# Patient Record
Sex: MALE | Race: WHITE | NOT HISPANIC OR LATINO | Employment: FULL TIME | URBAN - METROPOLITAN AREA
[De-identification: names, ages, dates, MRNs, and addresses within clinical notes are randomized per-mention and may not be internally consistent; named-entity substitution may affect disease eponyms.]

---

## 2017-06-26 ENCOUNTER — OFFICE VISIT (OUTPATIENT)
Dept: INTERNAL MEDICINE | Facility: CLINIC | Age: 37
End: 2017-06-26

## 2017-06-26 VITALS
SYSTOLIC BLOOD PRESSURE: 126 MMHG | WEIGHT: 175 LBS | OXYGEN SATURATION: 98 % | BODY MASS INDEX: 23.7 KG/M2 | HEIGHT: 72 IN | HEART RATE: 79 BPM | TEMPERATURE: 97.5 F | DIASTOLIC BLOOD PRESSURE: 86 MMHG

## 2017-06-26 DIAGNOSIS — E03.9 ACQUIRED HYPOTHYROIDISM: ICD-10-CM

## 2017-06-26 DIAGNOSIS — E06.0 ACUTE THYROIDITIS: Primary | ICD-10-CM

## 2017-06-26 DIAGNOSIS — E78.2 MIXED HYPERLIPIDEMIA: ICD-10-CM

## 2017-06-26 PROCEDURE — 99203 OFFICE O/P NEW LOW 30 MIN: CPT | Performed by: FAMILY MEDICINE

## 2017-06-26 RX ORDER — HYDROCODONE BITARTRATE AND ACETAMINOPHEN 7.5; 325 MG/1; MG/1
TABLET ORAL
COMMUNITY
Start: 2017-06-23 | End: 2017-08-11

## 2017-06-26 RX ORDER — LEVOTHYROXINE SODIUM 0.07 MG/1
75 TABLET ORAL DAILY
COMMUNITY
End: 2017-06-26 | Stop reason: SDUPTHER

## 2017-06-26 RX ORDER — LEVOTHYROXINE SODIUM 0.07 MG/1
75 TABLET ORAL DAILY
Qty: 90 TABLET | Refills: 3 | Status: SHIPPED | OUTPATIENT
Start: 2017-06-26 | End: 2017-08-16 | Stop reason: SDUPTHER

## 2017-06-26 NOTE — PROGRESS NOTES
Subjective   Babatunde Markham is a 36 y.o. male.     Chief Complaint   Patient presents with   • Hypothyroidism   • Hyperlipidemia         History of Present Illness   The patient is a delightful gentleman who is a  rep was discovered to have hypothyroidism with elevated TSH and hyperlipidemia earlier this year in [place].  He was placed on thyroid replacement but developed mild hyperthyroidism on a dose of 125 µg levothyroxine.  Otherwise is now on 75 µg daily and is relatively asymptomatic but has evidence of thyroiditis based on thyroid antibodies.  This difficult to say the level of hyperlipidemia will have when he is clinically euthyroid.      The following portions of the patient's history were reviewed and updated as appropriate: allergies, current medications, past social history and problem list.    Review of Systems   Constitutional: Negative.    HENT: Negative.    Eyes: Negative.    Respiratory: Negative.    Cardiovascular: Negative.    Gastrointestinal: Negative.    Endocrine: Negative.    Genitourinary: Negative.    Musculoskeletal: Negative.    Skin: Negative.    Allergic/Immunologic: Negative.    Neurological: Negative.    Hematological: Negative.    Psychiatric/Behavioral: Negative.        Objective   Vitals:    06/26/17 1316   BP: 126/86   Pulse: 79   Temp: 97.5 °F (36.4 °C)   SpO2: 98%     Physical Exam   Constitutional: He is oriented to person, place, and time. He appears well-developed and well-nourished.   HENT:   Head: Normocephalic and atraumatic.   Right Ear: Tympanic membrane and external ear normal.   Left Ear: Tympanic membrane and external ear normal.   Nose: Nose normal.   Mouth/Throat: Oropharynx is clear and moist.   Eyes: Conjunctivae and EOM are normal. Pupils are equal, round, and reactive to light.   Neck: Normal range of motion. Neck supple. No JVD present. Thyromegaly present.       Cardiovascular: Normal rate, regular rhythm, normal heart sounds and intact distal  pulses.    Pulmonary/Chest: Effort normal and breath sounds normal.   Abdominal: Soft. Bowel sounds are normal.   Musculoskeletal: Normal range of motion.   Lymphadenopathy:     He has no cervical adenopathy.   Neurological: He is alert and oriented to person, place, and time. No cranial nerve deficit. Coordination normal.   Skin: Skin is warm and dry. No rash noted.   Psychiatric: He has a normal mood and affect. His behavior is normal. Judgment and thought content normal.   Vitals reviewed.      Assessment/Plan   Problem List Items Addressed This Visit     None      Visit Diagnoses     Acute thyroiditis    -  Primary    Relevant Medications    levothyroxine (SYNTHROID, LEVOTHROID) 75 MCG tablet    Other Relevant Orders    TSH    T4, Free    T3, Free    Ambulatory Referral to Endocrinology    Acquired hypothyroidism        Relevant Medications    levothyroxine (SYNTHROID, LEVOTHROID) 75 MCG tablet    Other Relevant Orders    TSH    T4, Free    T3, Free    Ambulatory Referral to Endocrinology    Mixed hyperlipidemia        Relevant Orders    TSH    T4, Free    T3, Free    Ambulatory Referral to Endocrinology      Plan: Continue levothyroxine 75 µg daily with follow-up with endocrinology.  Return visit in about 4 months in regards to hyperlipidemia in conjunction with hypothyroidism.

## 2017-06-27 LAB
T3FREE SERPL-MCNC: 4 PG/ML (ref 2–4.4)
T4 FREE SERPL-MCNC: 1.4 NG/DL (ref 0.93–1.7)
TSH SERPL DL<=0.005 MIU/L-ACNC: 0.07 MIU/ML (ref 0.27–4.2)

## 2017-08-11 ENCOUNTER — OFFICE VISIT (OUTPATIENT)
Dept: ENDOCRINOLOGY | Age: 37
End: 2017-08-11

## 2017-08-11 VITALS
OXYGEN SATURATION: 98 % | DIASTOLIC BLOOD PRESSURE: 72 MMHG | WEIGHT: 174 LBS | HEART RATE: 70 BPM | BODY MASS INDEX: 23.57 KG/M2 | HEIGHT: 72 IN | SYSTOLIC BLOOD PRESSURE: 128 MMHG

## 2017-08-11 DIAGNOSIS — E03.8 OTHER SPECIFIED HYPOTHYROIDISM: Primary | ICD-10-CM

## 2017-08-11 DIAGNOSIS — R53.83 OTHER FATIGUE: ICD-10-CM

## 2017-08-11 PROCEDURE — 99244 OFF/OP CNSLTJ NEW/EST MOD 40: CPT | Performed by: INTERNAL MEDICINE

## 2017-08-11 NOTE — PROGRESS NOTES
Chief Complaint   Patient presents with   • Hypothyroidism   New Patient Appointment/ Hypothyroidism    Babatunde Markham 36 y.o. WM presents as a new patient for the evaluation of Hypothyroidism. Consulted by   Pt was diagnosed with Hypothyroidism in April 2017 for his routine health exam. He was noted to have elevated TSH.   Pt reports having symptoms for about 6 months and they have been gradually getting worse.   Currently on levothyroxine 75 mcg oral daily, takes the medication every day on empty stomach.     He does report better energy levels now that he is on thyroid medication, pt lost about 10 pounds in the last few months with exercise and diet changes, normal Bm, no hair loss, no increased sweating, some dry skin, sleep is disturbed. Normal temperature preference. No c/o tremors, no racing of heart and no eye symptoms.   Denied c/o difficulty breathing, some difficulty in swallowing and no change in voice.   No family hx of thyroid disease.     Thyroid U/S 4/14/17 - Rt thyroid lobe showed 7 mm nodule.     I have reviewed the patient's allergies, medicines, past medical hx, family hx and social hx in detail.    Past Medical History:   Diagnosis Date   • Acquired hypothyroidism    • Acute thyroiditis    • Mixed hyperlipidemia        Family History   Problem Relation Age of Onset   • Heart disease Father    • Heart disease Maternal Grandmother    • Heart disease Maternal Grandfather    • Alzheimer's disease Paternal Grandmother    • Alzheimer's disease Paternal Grandfather        Social History     Social History   • Marital status:      Spouse name: N/A   • Number of children: N/A   • Years of education: N/A     Occupational History   • Not on file.     Social History Main Topics   • Smoking status: Never Smoker   • Smokeless tobacco: Never Used   • Alcohol use No   • Drug use: Not on file   • Sexual activity: Not on file     Other Topics Concern   • Not on file     Social History Narrative  "      No Known Allergies      Current Outpatient Prescriptions:   •  levothyroxine (SYNTHROID, LEVOTHROID) 75 MCG tablet, Take 1 tablet by mouth Daily., Disp: 90 tablet, Rfl: 3     Review of Systems   Constitutional: Negative for fever.   HENT: Negative for facial swelling, nosebleeds, trouble swallowing and voice change.    Eyes: Negative for pain and redness.   Respiratory: Negative for shortness of breath and wheezing.    Cardiovascular: Negative for palpitations and leg swelling.   Gastrointestinal: Negative for abdominal pain, diarrhea and vomiting.   Endocrine: Negative for polydipsia and polyuria.   Genitourinary: Negative for decreased urine volume and frequency.   Musculoskeletal: Negative for joint swelling and neck pain.   Skin: Negative for rash.   Allergic/Immunologic: Negative for immunocompromised state.   Neurological: Negative for seizures and facial asymmetry.   Hematological: Does not bruise/bleed easily.   Psychiatric/Behavioral: Negative for agitation and confusion.       Objective:    /72  Pulse 70  Ht 72\" (182.9 cm)  Wt 174 lb (78.9 kg)  SpO2 98%  BMI 23.6 kg/m2    Physical Exam  Gen exam - alert and oriented x 3, normal built, not in distress.   HEENT - No acanthosis nigricans. Thyroid palpable. Thyroid nodule not palpable  Resp - Clear to auscultation.   CVS - S1,S2 heard and no murmurs.   Abd - Non tender, BS heard.   Ext - No edema and intact pin prick and proprioception. Normal reflexes    Results Review:    I reviewed the patient's new clinical results.    Office Visit on 06/26/2017   Component Date Value Ref Range Status   • TSH 06/26/2017 0.074* 0.270 - 4.200 mIU/mL Final   • Free T4 06/26/2017 1.40  0.93 - 1.70 ng/dL Final   • T3, Free 06/26/2017 4.0  2.0 - 4.4 pg/mL Final         Babatunde was seen today for hypothyroidism.    Diagnoses and all orders for this visit:    Other specified hypothyroidism  -     TSH  -     T4, Free  -     Thyroid Peroxidase Antibody  -     Basic " Metabolic Panel  -     Vitamin D 25 Hydroxy  -     Lipid Panel  -     Lipid Panel; Future  -     TSH; Future  -     Vitamin B12 & Folate; Future  -     Vitamin D 25 Hydroxy; Future    Other fatigue  -     TSH  -     T4, Free  -     Thyroid Peroxidase Antibody  -     Basic Metabolic Panel  -     Vitamin D 25 Hydroxy  -     Lipid Panel  -     Lipid Panel; Future  -     TSH; Future  -     Vitamin B12 & Folate; Future  -     Vitamin D 25 Hydroxy; Future      Hypothyroidism  Continue levothyroxine 75 µg oral daily.  Will adjust the dosage of levothyroxine based on patient's blood work up to date.    Thyroid nodule  Since the thyroid nodule is less than 1 cm in size would hold off on FNA at this time.  Will repeat thyroid ultrasound in a period of one year to monitor for the change in the size of the thyroid nodule.    Chronic fatigue  Will check vitamin D 25-hydroxy, lipid panel  Counseled the patient if patient's lipid panel is still significantly elevated given his family history of cardiac disease might consider starting the patient on statin.    Thank you for asking me to see your patient, Babatunde Markham in consultation.    34 minutes out of 60 minutes face to face spent in counseling the patient extensively on hypothyroidism, pathogenesis and FNA guidelines for thyroid nodule biopsy.    Rowdy De La Cruz MD  08/11/17

## 2017-08-15 ENCOUNTER — LAB (OUTPATIENT)
Dept: ENDOCRINOLOGY | Age: 37
End: 2017-08-15

## 2017-08-15 DIAGNOSIS — E03.8 OTHER SPECIFIED HYPOTHYROIDISM: ICD-10-CM

## 2017-08-15 DIAGNOSIS — R53.83 OTHER FATIGUE: ICD-10-CM

## 2017-08-15 LAB
25(OH)D3+25(OH)D2 SERPL-MCNC: 26.6 NG/ML (ref 30–100)
CHOLEST SERPL-MCNC: 224 MG/DL (ref 0–200)
FOLATE SERPL-MCNC: 19.37 NG/ML (ref 4.78–24.2)
HDLC SERPL-MCNC: 43 MG/DL (ref 40–60)
LDLC SERPL CALC-MCNC: 164 MG/DL (ref 0–100)
TRIGL SERPL-MCNC: 83 MG/DL (ref 0–150)
TSH SERPL DL<=0.005 MIU/L-ACNC: 5.36 MIU/ML (ref 0.27–4.2)
VIT B12 SERPL-MCNC: 793 PG/ML (ref 211–946)
VLDLC SERPL CALC-MCNC: 16.6 MG/DL (ref 5–40)

## 2017-08-16 RX ORDER — LEVOTHYROXINE SODIUM 88 UG/1
88 TABLET ORAL DAILY
Qty: 30 TABLET | Refills: 11 | Status: SHIPPED | OUTPATIENT
Start: 2017-08-16 | End: 2018-01-30 | Stop reason: SDUPTHER

## 2017-08-16 RX ORDER — ERGOCALCIFEROL 1.25 MG/1
50000 CAPSULE ORAL WEEKLY
Qty: 30 CAPSULE | Refills: 11 | Status: SHIPPED | OUTPATIENT
Start: 2017-08-16 | End: 2022-05-23

## 2017-10-27 ENCOUNTER — OFFICE VISIT (OUTPATIENT)
Dept: INTERNAL MEDICINE | Facility: CLINIC | Age: 37
End: 2017-10-27

## 2017-10-27 VITALS
DIASTOLIC BLOOD PRESSURE: 72 MMHG | TEMPERATURE: 97.7 F | HEART RATE: 82 BPM | BODY MASS INDEX: 24.14 KG/M2 | WEIGHT: 178 LBS | OXYGEN SATURATION: 98 % | SYSTOLIC BLOOD PRESSURE: 118 MMHG

## 2017-10-27 DIAGNOSIS — E03.9 ACQUIRED HYPOTHYROIDISM: Primary | ICD-10-CM

## 2017-10-27 DIAGNOSIS — E06.0 ACUTE THYROIDITIS: ICD-10-CM

## 2017-10-27 DIAGNOSIS — E78.2 MIXED HYPERLIPIDEMIA: ICD-10-CM

## 2017-10-27 PROCEDURE — 99213 OFFICE O/P EST LOW 20 MIN: CPT | Performed by: FAMILY MEDICINE

## 2017-10-27 NOTE — PROGRESS NOTES
Subjective   Babatunde Markham is a 37 y.o. male.     Chief Complaint   Patient presents with   • Hypothyroidism   • Hyperlipidemia   Patient is a healthy 36-year-old gentleman with history of moderate hypercholesterolemia and family history of atherosclerotic heart disease.  He has had thyroiditis and is now on thyroid supplement with Synthroid 88 µg daily.  He is followed by endocrinology also.  We discussed is improved cholesterol panel which is expected given normalization of his thyroid treatment.  He would like to try staying off statins for a while and I will see him back in one year he will follow-up with endocrinology as well.      History of Present Illness    Mr. Markham has returned for follow-up of hypothyroidism with prior thyroiditis.. He has   The following portions of the patient's history were reviewed and updated as appropriate: allergies, current medications, past social history and problem list.    Review of Systems   Constitutional: Negative.    HENT: Negative.    Eyes: Negative.    Respiratory: Negative.    Cardiovascular: Negative.    Gastrointestinal: Negative.    Endocrine: Negative.    Genitourinary: Negative.    Musculoskeletal: Negative.    Skin: Negative.    Allergic/Immunologic: Negative.    Neurological: Negative.    Hematological: Negative.    Psychiatric/Behavioral: Negative.        Objective   Vitals:    10/27/17 0952   BP: 118/72   Pulse: 82   Temp: 97.7 °F (36.5 °C)   SpO2: 98%     Physical Exam   Constitutional: He is oriented to person, place, and time. He appears well-developed and well-nourished.   HENT:   Head: Normocephalic and atraumatic.   Right Ear: Tympanic membrane and external ear normal.   Left Ear: Tympanic membrane and external ear normal.   Nose: Nose normal.   Mouth/Throat: Oropharynx is clear and moist.   Eyes: Conjunctivae and EOM are normal. Pupils are equal, round, and reactive to light.   Neck: Normal range of motion. Neck supple. No JVD present. No thyromegaly  present.   Cardiovascular: Normal rate, regular rhythm, normal heart sounds and intact distal pulses.    Pulmonary/Chest: Effort normal and breath sounds normal.   Abdominal: Soft. Bowel sounds are normal.   Musculoskeletal: Normal range of motion.   Lymphadenopathy:     He has no cervical adenopathy.   Neurological: He is alert and oriented to person, place, and time. No cranial nerve deficit. Coordination normal.   Skin: Skin is warm and dry. No rash noted.   Psychiatric: He has a normal mood and affect. His behavior is normal. Judgment and thought content normal.   Vitals reviewed.      Assessment/Plan   Problem List Items Addressed This Visit        Cardiovascular and Mediastinum    Mixed hyperlipidemia       Endocrine    Acute thyroiditis    Acquired hypothyroidism - Primary      Plan: Follow-up one year no changes in medications for follow-up with endocrinology recheck lipid panel one year or sooner.

## 2018-01-16 ENCOUNTER — OFFICE VISIT CONVERTED (OUTPATIENT)
Dept: FAMILY MEDICINE CLINIC | Age: 38
End: 2018-01-16
Attending: FAMILY MEDICINE

## 2018-01-24 LAB
25(OH)D3+25(OH)D2 SERPL-MCNC: 40.1 NG/ML (ref 30–100)
BUN SERPL-MCNC: 15 MG/DL (ref 6–20)
BUN/CREAT SERPL: 11.6 (ref 7–25)
CALCIUM SERPL-MCNC: 9.9 MG/DL (ref 8.6–10.5)
CHLORIDE SERPL-SCNC: 102 MMOL/L (ref 98–107)
CHOLEST SERPL-MCNC: 217 MG/DL (ref 0–200)
CO2 SERPL-SCNC: 27.5 MMOL/L (ref 22–29)
CREAT SERPL-MCNC: 1.29 MG/DL (ref 0.76–1.27)
GLUCOSE SERPL-MCNC: 93 MG/DL (ref 65–99)
HDLC SERPL-MCNC: 40 MG/DL (ref 40–60)
INTERPRETATION: NORMAL
LDLC SERPL CALC-MCNC: 157 MG/DL (ref 0–100)
POTASSIUM SERPL-SCNC: 4.7 MMOL/L (ref 3.5–5.2)
SODIUM SERPL-SCNC: 141 MMOL/L (ref 136–145)
T4 FREE SERPL-MCNC: 1.31 NG/DL (ref 0.93–1.7)
THYROPEROXIDASE AB SERPL-ACNC: >600 IU/ML (ref 0–34)
TRIGL SERPL-MCNC: 102 MG/DL (ref 0–150)
TSH SERPL DL<=0.005 MIU/L-ACNC: 7.98 MIU/ML (ref 0.27–4.2)
VLDLC SERPL CALC-MCNC: 20.4 MG/DL (ref 5–40)

## 2018-01-29 ENCOUNTER — OFFICE VISIT CONVERTED (OUTPATIENT)
Dept: FAMILY MEDICINE CLINIC | Age: 38
End: 2018-01-29
Attending: NURSE PRACTITIONER

## 2018-01-30 ENCOUNTER — OFFICE VISIT (OUTPATIENT)
Dept: ENDOCRINOLOGY | Age: 38
End: 2018-01-30

## 2018-01-30 VITALS
DIASTOLIC BLOOD PRESSURE: 74 MMHG | HEIGHT: 72 IN | HEART RATE: 89 BPM | OXYGEN SATURATION: 99 % | SYSTOLIC BLOOD PRESSURE: 122 MMHG | BODY MASS INDEX: 23.7 KG/M2 | WEIGHT: 175 LBS

## 2018-01-30 DIAGNOSIS — R68.89 FLU-LIKE SYMPTOMS: ICD-10-CM

## 2018-01-30 DIAGNOSIS — E55.9 VITAMIN D DEFICIENCY: ICD-10-CM

## 2018-01-30 DIAGNOSIS — E03.9 ACQUIRED HYPOTHYROIDISM: Primary | ICD-10-CM

## 2018-01-30 PROCEDURE — 99213 OFFICE O/P EST LOW 20 MIN: CPT | Performed by: INTERNAL MEDICINE

## 2018-01-30 RX ORDER — LEVOTHYROXINE SODIUM 0.1 MG/1
100 TABLET ORAL DAILY
Qty: 30 TABLET | Refills: 11 | Status: SHIPPED | OUTPATIENT
Start: 2018-01-30 | End: 2019-02-18 | Stop reason: SDUPTHER

## 2018-01-30 NOTE — PROGRESS NOTES
37 y.o.    Patient Care Team:  Valentin Shi Jr., MD as PCP - General (Family Medicine)    Chief Complaint:    5 MONTH FOLLOW UP/HYPOTHROIDISM  Subjective     HPI    Babatunde Markham 37 y.o. WM presents as a follow up patient for the evaluation of Hypothyroidism. Consulted by     Pt was diagnosed with Hypothyroidism in April 2017 for his routine health exam.   Currently on levothyroxine 88 µg oral daily, patient is not so compliant with his medication, happens to forget dosages at least 2-3 times a month.    His energy levels are very poor, he is suffering with a viral infection, reports extreme body aches, weight has been relatively stable, normal bowel movements, no hair loss, complains of increased sweating and dry skin, sleep is disturbed again due to viral infection.  Feels extremely cold.  No tremors, racing of heart or eye symptoms  Does complain of shortness of breath, difficulty in swallowing due to the sore throat and does have change in voice.    No family hx of thyroid disease.      Thyroid U/S 4/14/17 - Rt thyroid lobe showed 7 mm nodule.     Pt c/o body aches, pain in the stomach and back ache, c/o sob, c/o sore throat, cough with some phlegm, eating very little, decreased appetite, no nausea or vomiting.     Interval History      The following portions of the patient's history were reviewed and updated as appropriate: allergies, current medications, past family history, past medical history, past social history, past surgical history and problem list.    Past Medical History:   Diagnosis Date   • Acquired hypothyroidism    • Acute thyroiditis    • Mixed hyperlipidemia      Family History   Problem Relation Age of Onset   • Heart disease Father    • Heart disease Maternal Grandmother    • Heart disease Maternal Grandfather    • Alzheimer's disease Paternal Grandmother    • Alzheimer's disease Paternal Grandfather      Social History     Social History   • Marital status:      Spouse name: N/A  "  • Number of children: N/A   • Years of education: N/A     Occupational History   • Not on file.     Social History Main Topics   • Smoking status: Never Smoker   • Smokeless tobacco: Never Used   • Alcohol use No   • Drug use: Not on file   • Sexual activity: Not on file     Other Topics Concern   • Not on file     Social History Narrative     No Known Allergies    Current Outpatient Prescriptions:   •  levothyroxine (SYNTHROID, LEVOTHROID) 100 MCG tablet, Take 1 tablet by mouth Daily., Disp: 30 tablet, Rfl: 11  •  vitamin D (ERGOCALCIFEROL) 24871 units capsule capsule, Take 1 capsule by mouth 1 (One) Time Per Week., Disp: 30 capsule, Rfl: 11        Review of Systems   Constitutional: Positive for fatigue and fever.   HENT: Positive for sore throat. Negative for facial swelling, nosebleeds, trouble swallowing and voice change.    Eyes: Negative for pain and redness.   Respiratory: Positive for shortness of breath. Negative for wheezing.    Cardiovascular: Negative for palpitations and leg swelling.   Gastrointestinal: Negative for abdominal pain, diarrhea and vomiting.   Endocrine: Negative for polydipsia and polyuria.   Genitourinary: Negative for decreased urine volume and frequency.   Musculoskeletal: Negative for joint swelling and neck pain.   Skin: Negative for rash.   Allergic/Immunologic: Negative for immunocompromised state.   Neurological: Positive for dizziness, light-headedness and headaches. Negative for seizures and facial asymmetry.   Hematological: Does not bruise/bleed easily.   Psychiatric/Behavioral: Negative for agitation and confusion.       Objective       Vitals:    01/30/18 1333   BP: 122/74   Pulse: 89   SpO2: 99%   Weight: 79.4 kg (175 lb)   Height: 182.9 cm (72\")     Body mass index is 23.73 kg/(m^2).      Physical Exam   Gen exam - alert and oriented x 3, not in distress,warm to touch.   HEENT - No acanthosis nigricans. Thyroid palpable.   Resp - Clear to auscultation.   CVS - S1,S2 " heard and no murmurs.   Abd - Non tender, BS heard.   Ext - No edema    Results Review:     I reviewed the patient's new clinical results.    Medical records reviewed  Summary:done      Lab on 08/15/2017   Component Date Value Ref Range Status   • Total Cholesterol 08/15/2017 224* 0 - 200 mg/dL Final   • Triglycerides 08/15/2017 83  0 - 150 mg/dL Final   • HDL Cholesterol 08/15/2017 43  40 - 60 mg/dL Final   • VLDL Cholesterol 08/15/2017 16.6  5 - 40 mg/dL Final   • LDL Cholesterol  08/15/2017 164* 0 - 100 mg/dL Final   • TSH 08/15/2017 5.360* 0.270 - 4.200 mIU/mL Final   • Vitamin B-12 08/15/2017 793  211 - 946 pg/mL Final   • Folate 08/15/2017 19.37  4.78 - 24.20 ng/mL Final   • 25 Hydroxy, Vitamin D 08/15/2017 26.6* 30.0 - 100.0 ng/mL Final    Comment: Reference Range for Total Vitamin D 25(OH)  Deficiency    <20.0 ng/mL  Insufficiency 21-29 ng/mL  Sufficiency    ng/mL  Toxicity      >100 ng/ml          No results found for: HGBA1C  Lab Results   Component Value Date    CREATININE 1.29 (H) 01/23/2018     Imaging Results (most recent)     None                Assessment and Plan:    Babatunde was seen today for hypothyroidism.    Diagnoses and all orders for this visit:    Acquired hypothyroidism  -     TSH; Future  -     T4, Free; Future    Vitamin D deficiency  -     TSH; Future  -     T4, Free; Future    Flu-like symptoms    Other orders  -     levothyroxine (SYNTHROID, LEVOTHROID) 100 MCG tablet; Take 1 tablet by mouth Daily.    Hypothyroidism  TSH levels are still high could be related to his noncompliance  Explained to the patient to take the medication every single day on empty stomach  Will increase levothyroxin to 100 µg oral daily  Will repeat thyroid function tests in a period of 6 weeks    Vitamin D deficiency  Continue vitamin D replacement 50,000 units every weekly    Flulike symptoms  Suspect patient could have flu or strep throat based on his symptoms  Alerted the patient to go to his primary care  physician or urgent care.      Reviewed Lab results with the patient.     12 minutes out of 20 minutes face to face spent in counseling the patient extensively on on medication changes, referral instructions.

## 2018-03-13 ENCOUNTER — RESULTS ENCOUNTER (OUTPATIENT)
Dept: ENDOCRINOLOGY | Age: 38
End: 2018-03-13

## 2018-03-13 DIAGNOSIS — E55.9 VITAMIN D DEFICIENCY: ICD-10-CM

## 2018-03-13 DIAGNOSIS — E03.9 ACQUIRED HYPOTHYROIDISM: ICD-10-CM

## 2018-04-19 LAB
T4 FREE SERPL-MCNC: 1.45 NG/DL (ref 0.93–1.7)
TSH SERPL DL<=0.005 MIU/L-ACNC: 0.75 MIU/ML (ref 0.27–4.2)

## 2018-05-03 ENCOUNTER — OFFICE VISIT (OUTPATIENT)
Dept: ENDOCRINOLOGY | Age: 38
End: 2018-05-03

## 2018-05-03 VITALS
HEIGHT: 72 IN | OXYGEN SATURATION: 98 % | DIASTOLIC BLOOD PRESSURE: 72 MMHG | WEIGHT: 172 LBS | BODY MASS INDEX: 23.3 KG/M2 | SYSTOLIC BLOOD PRESSURE: 120 MMHG | HEART RATE: 70 BPM

## 2018-05-03 DIAGNOSIS — E55.9 VITAMIN D DEFICIENCY: ICD-10-CM

## 2018-05-03 DIAGNOSIS — E03.9 ACQUIRED HYPOTHYROIDISM: Primary | ICD-10-CM

## 2018-05-03 PROCEDURE — 99213 OFFICE O/P EST LOW 20 MIN: CPT | Performed by: INTERNAL MEDICINE

## 2018-05-03 NOTE — PROGRESS NOTES
37 y.o.    Patient Care Team:  Valentin Shi Jr., MD as PCP - General (Family Medicine)    Chief Complaint:    3 MONTH FOLLOW UP/HYPOTHYROIDISM  Subjective     HPI    Babatunde RITA Markham 37 y.o. WM presents as a follow up patient for the evaluation of Hypothyroidism. Consulted by      Pt was diagnosed with Hypothyroidism in April 2017 for his routine health exam.   Currently on levothyroxine 100 mcg oral daily, takes the medication daily.     Patient reports his energy levels are good, weight has been relatively stable, normal bowel movements, no hair loss, no increased sweating or dry skin.  Sleep is decently good.  No heat or cold intolerance.  No tremors, racing of heart or eye symptoms.  No complaints of shortness of breath, difficulty in swallowing or change in voice.  No family history of thyroid disease.  Thyroid U/S 4/14/17 - Rt thyroid lobe showed 7 mm nodule.          Interval History      The following portions of the patient's history were reviewed and updated as appropriate: allergies, current medications, past family history, past medical history, past social history, past surgical history and problem list.    Past Medical History:   Diagnosis Date   • Acquired hypothyroidism    • Acute thyroiditis    • Mixed hyperlipidemia      Family History   Problem Relation Age of Onset   • Heart disease Father    • Heart disease Maternal Grandmother    • Heart disease Maternal Grandfather    • Alzheimer's disease Paternal Grandmother    • Alzheimer's disease Paternal Grandfather      Social History     Social History   • Marital status:      Spouse name: N/A   • Number of children: N/A   • Years of education: N/A     Occupational History   • Not on file.     Social History Main Topics   • Smoking status: Never Smoker   • Smokeless tobacco: Never Used   • Alcohol use No   • Drug use: Unknown   • Sexual activity: Not on file     Other Topics Concern   • Not on file     Social History Narrative   • No  "narrative on file     No Known Allergies    Current Outpatient Prescriptions:   •  levothyroxine (SYNTHROID, LEVOTHROID) 100 MCG tablet, Take 1 tablet by mouth Daily., Disp: 30 tablet, Rfl: 11  •  vitamin D (ERGOCALCIFEROL) 71904 units capsule capsule, Take 1 capsule by mouth 1 (One) Time Per Week., Disp: 30 capsule, Rfl: 11        Review of Systems   Constitutional: Negative for appetite change, fatigue and fever.   Eyes: Negative for visual disturbance.   Respiratory: Negative for shortness of breath.    Cardiovascular: Negative for palpitations and leg swelling.   Gastrointestinal: Negative for abdominal pain and vomiting.   Endocrine: Negative for polydipsia and polyuria.   Musculoskeletal: Negative for joint swelling and neck pain.   Skin: Negative for rash.   Neurological: Negative for weakness and numbness.   Psychiatric/Behavioral: Negative for behavioral problems.       Objective       Vitals:    05/03/18 1025   BP: 120/72   Pulse: 70   SpO2: 98%   Weight: 78 kg (172 lb)   Height: 182.9 cm (72\")     Body mass index is 23.33 kg/m².      Physical Exam   Gen exam - alert and oriented x 3,not in distress.   HEENT - Thyroid palpable.   Resp - Clear to auscultation.   CVS - S1,S2 heard and no murmurs.   Abd - Non tender, BS heard.   Ext - No edema and intact pin prick and proprioception.     Results Review:    I reviewed the patient's new clinical results.    Medical records reviewed  Summary: done      Results Encounter on 03/13/2018   Component Date Value Ref Range Status   • TSH 04/19/2018 0.752  0.270 - 4.200 mIU/mL Final   • Free T4 04/19/2018 1.45  0.93 - 1.70 ng/dL Final     No results found for: HGBA1C  Lab Results   Component Value Date    CREATININE 1.29 (H) 01/23/2018     Imaging Results (most recent)     None                Assessment and Plan:    Babatunde was seen today for hypothyroidism.    Diagnoses and all orders for this visit:    Acquired hypothyroidism  -     Lipid Panel; Future  -     Vitamin D " 25 Hydroxy; Future  -     Vitamin B12 & Folate; Future  -     TSH; Future  -     T4, Free; Future    Vitamin D deficiency  -     Lipid Panel; Future  -     Vitamin D 25 Hydroxy; Future  -     Vitamin B12 & Folate; Future  -     TSH; Future  -     T4, Free; Future      Hypothyroidism  Continue levothyroxine 100 µg oral daily  TSH levels are within normal limits.    Vitamin D deficiency  Vitamin D levels are normal as of January 2018  Continue the current replacement dosages.    Hyperlipidemia  LDL levels are mildly elevated but since patient does not have diabetes, blood pressure or cardiac risk well not start patient on any lipid-lowering agent.    Reviewed Lab results with the patient.

## 2018-07-11 ENCOUNTER — OFFICE VISIT CONVERTED (OUTPATIENT)
Dept: FAMILY MEDICINE CLINIC | Age: 38
End: 2018-07-11
Attending: NURSE PRACTITIONER

## 2018-10-30 ENCOUNTER — RESULTS ENCOUNTER (OUTPATIENT)
Dept: ENDOCRINOLOGY | Age: 38
End: 2018-10-30

## 2018-10-30 DIAGNOSIS — E03.9 ACQUIRED HYPOTHYROIDISM: ICD-10-CM

## 2018-10-30 DIAGNOSIS — E55.9 VITAMIN D DEFICIENCY: ICD-10-CM

## 2018-11-13 LAB
25(OH)D3+25(OH)D2 SERPL-MCNC: 25.4 NG/ML (ref 30–100)
CHOLEST SERPL-MCNC: 220 MG/DL (ref 0–200)
FOLATE SERPL-MCNC: >20 NG/ML (ref 4.78–24.2)
HDLC SERPL-MCNC: 41 MG/DL (ref 40–60)
INTERPRETATION: NORMAL
LDLC SERPL CALC-MCNC: 158 MG/DL (ref 0–100)
T4 FREE SERPL-MCNC: 1.12 NG/DL (ref 0.93–1.7)
TRIGL SERPL-MCNC: 106 MG/DL (ref 0–150)
TSH SERPL DL<=0.005 MIU/L-ACNC: 15.63 MIU/ML (ref 0.27–4.2)
VIT B12 SERPL-MCNC: 1086 PG/ML (ref 211–946)
VLDLC SERPL CALC-MCNC: 21.2 MG/DL (ref 5–40)

## 2018-11-21 ENCOUNTER — OFFICE VISIT (OUTPATIENT)
Dept: ENDOCRINOLOGY | Age: 38
End: 2018-11-21

## 2018-11-21 VITALS
OXYGEN SATURATION: 98 % | WEIGHT: 179 LBS | BODY MASS INDEX: 24.24 KG/M2 | HEART RATE: 76 BPM | DIASTOLIC BLOOD PRESSURE: 70 MMHG | SYSTOLIC BLOOD PRESSURE: 130 MMHG | HEIGHT: 72 IN

## 2018-11-21 DIAGNOSIS — E55.9 VITAMIN D DEFICIENCY: ICD-10-CM

## 2018-11-21 DIAGNOSIS — E03.9 ACQUIRED HYPOTHYROIDISM: Primary | ICD-10-CM

## 2018-11-21 PROCEDURE — 99214 OFFICE O/P EST MOD 30 MIN: CPT | Performed by: INTERNAL MEDICINE

## 2018-11-21 NOTE — PROGRESS NOTES
38 y.o.    Patient Care Team:  Valentin Shi Jr., MD as PCP - General (Family Medicine)    Chief Complaint:    6 MONTH FOLLOW UP/ HYPOTHYROIDISM  Subjective     HPI     Babatunde B Rashi 38 y.o. WM presents as a follow up patient for the evaluation of Hypothyroidism. Consulted by     Patient was diagnosed with hypothyroidism in April 2017 on his routine physical examination.  Today in clinic he reports that he is on levothyroxin 100 µg oral daily, takes it on empty stomach.    He has been noncompliant with medication, has been taking the medication on and off for the last few months.  He reports that he has been busy taking care of his 3 kids while his wife has been sick and he was also traveling for work when he forgot to take the medication with him.    He does report that he is being tired for the last few months, gained about 5 pounds of weight since his last visit, normal bowel movements, no hair loss, no increased sweating or dry skin.  He has been sleeping relatively well.  Complains of some cold intolerance.  No hyperthyroid symptoms.  No complaints of shortness of breath, difficulty in swallowing or change in voice.  No family history of thyroid disease.     Thyroid U/S July 2018 - Rt thyroid lobe showed 7 mm nodule.       Reviewed primary care physician's/consulting physician documentation and lab results :     Interval History      The following portions of the patient's history were reviewed and updated as appropriate: allergies, current medications, past family history, past medical history, past social history, past surgical history and problem list.    Past Medical History:   Diagnosis Date   • Acquired hypothyroidism    • Acute thyroiditis    • Mixed hyperlipidemia      Family History   Problem Relation Age of Onset   • Heart disease Father    • Heart disease Maternal Grandmother    • Heart disease Maternal Grandfather    • Alzheimer's disease Paternal Grandmother    • Alzheimer's disease Paternal  "Grandfather      Social History     Socioeconomic History   • Marital status:      Spouse name: Not on file   • Number of children: Not on file   • Years of education: Not on file   • Highest education level: Not on file   Social Needs   • Financial resource strain: Not on file   • Food insecurity - worry: Not on file   • Food insecurity - inability: Not on file   • Transportation needs - medical: Not on file   • Transportation needs - non-medical: Not on file   Occupational History   • Not on file   Tobacco Use   • Smoking status: Never Smoker   • Smokeless tobacco: Never Used   Substance and Sexual Activity   • Alcohol use: No   • Drug use: Not on file   • Sexual activity: Not on file   Other Topics Concern   • Not on file   Social History Narrative   • Not on file     No Known Allergies    Current Outpatient Medications:   •  levothyroxine (SYNTHROID, LEVOTHROID) 100 MCG tablet, Take 1 tablet by mouth Daily., Disp: 30 tablet, Rfl: 11  •  vitamin D (ERGOCALCIFEROL) 59260 units capsule capsule, Take 1 capsule by mouth 1 (One) Time Per Week., Disp: 30 capsule, Rfl: 11        Review of Systems   Constitutional: Negative for appetite change, fatigue and fever.   Eyes: Negative for visual disturbance.   Respiratory: Negative for shortness of breath.    Cardiovascular: Negative for palpitations and leg swelling.   Gastrointestinal: Negative for abdominal pain and vomiting.   Endocrine: Negative for polydipsia and polyuria.   Musculoskeletal: Negative for joint swelling and neck pain.   Skin: Negative for rash.   Neurological: Negative for weakness and numbness.   Psychiatric/Behavioral: Negative for behavioral problems.       Objective       Vitals:    11/21/18 1022   BP: 130/70   Pulse: 76   SpO2: 98%   Weight: 81.2 kg (179 lb)   Height: 182.9 cm (72\")     Body mass index is 24.28 kg/m².      Physical Exam   Constitutional: He is oriented to person, place, and time. He appears well-nourished.   HENT:   Head: " Normocephalic and atraumatic.   Eyes: Conjunctivae and EOM are normal. No scleral icterus.   Neck: No thyromegaly present.   Cardiovascular: Normal rate and regular rhythm.   Pulmonary/Chest: Effort normal and breath sounds normal. No stridor. No respiratory distress. He has no wheezes.   Abdominal: Soft. Bowel sounds are normal. He exhibits no distension. There is no tenderness.   Musculoskeletal: He exhibits no edema or deformity.   Lymphadenopathy:     He has no cervical adenopathy.   Neurological: He is alert and oriented to person, place, and time.   Skin: Skin is warm and dry. No rash noted. He is not diaphoretic.   Psychiatric: He has a normal mood and affect.   Vitals reviewed.    Results Review:     I reviewed the patient's new clinical results and mentioned them above in HPI and in plan as well.    Medical records reviewed  Summary:done      Results Encounter on 10/30/2018   Component Date Value Ref Range Status   • Total Cholesterol 11/12/2018 220* 0 - 200 mg/dL Final   • Triglycerides 11/12/2018 106  0 - 150 mg/dL Final   • HDL Cholesterol 11/12/2018 41  40 - 60 mg/dL Final   • VLDL Cholesterol 11/12/2018 21.2  5 - 40 mg/dL Final   • LDL Cholesterol  11/12/2018 158* 0 - 100 mg/dL Final   • 25 Hydroxy, Vitamin D 11/12/2018 25.4* 30.0 - 100.0 ng/ml Final    Comment: Reference Range for Total Vitamin D 25(OH)  Deficiency    <20.0 ng/mL  Insufficiency 21-29 ng/mL  Sufficiency    ng/mL  Toxicity      >100 ng/ml        • Vitamin B-12 11/12/2018 1,086* 211 - 946 pg/mL Final   • Folate 11/12/2018 >20.00  4.78 - 24.20 ng/mL Final   • TSH 11/12/2018 15.630* 0.270 - 4.200 mIU/mL Final   • Free T4 11/12/2018 1.12  0.93 - 1.70 ng/dL Final   • Interpretation 11/12/2018 Note   Final    Supplemental report is available.     No results found for: HGBA1C  Lab Results   Component Value Date    CREATININE 1.29 (H) 01/23/2018     Imaging Results (most recent)     None                Assessment and Plan:    Babatunde was  "seen today for hypothyroidism.    Diagnoses and all orders for this visit:    Acquired hypothyroidism  -     TSH  -     T4, Free  -     TSH; Future  -     T4, Free; Future  -     Vitamin B12 & Folate; Future  -     Vitamin D 25 Hydroxy; Future    Vitamin D deficiency  -     TSH  -     T4, Free  -     TSH; Future  -     T4, Free; Future  -     Vitamin B12 & Folate; Future  -     Vitamin D 25 Hydroxy; Future      Hypothyroidism-levels worse  Discussed with the patient about being compliant with his medication.  Will repeat her thyroid function tests when he is taking the medication regularly in 6 weeks from now.  If the levels are still worse would consider increasing the dosage of levothyroxin.  Advised the patient to take the medication on empty stomach.    Vitamin D deficiency  Levels are worse.  Advised the patient to start taking his vitamin D replacement 50,000 units every weekly.    Reviewed Lab results with the patient.             Rowdy De La Cruz MD  11/21/18    [employer] / transcription disclaimer:     \"Dictated utilizing Dragon dictation\".  "

## 2019-01-07 ENCOUNTER — LAB (OUTPATIENT)
Dept: ENDOCRINOLOGY | Age: 39
End: 2019-01-07

## 2019-01-07 DIAGNOSIS — E55.9 VITAMIN D DEFICIENCY: ICD-10-CM

## 2019-01-07 DIAGNOSIS — E03.9 ACQUIRED HYPOTHYROIDISM: ICD-10-CM

## 2019-01-07 LAB
25(OH)D3+25(OH)D2 SERPL-MCNC: 24.2 NG/ML (ref 30–100)
FOLATE SERPL-MCNC: 17.64 NG/ML (ref 4.78–24.2)
T4 FREE SERPL-MCNC: 1.31 NG/DL (ref 0.93–1.7)
TSH SERPL DL<=0.005 MIU/L-ACNC: 2.39 MIU/ML (ref 0.27–4.2)
VIT B12 SERPL-MCNC: 873 PG/ML (ref 211–946)

## 2019-01-12 NOTE — PROGRESS NOTES
Mail results to pt. Thyroid levels are normal. No changes at this time.   Will recommend taking OTC vitamin D 2000 units daily.

## 2019-02-20 RX ORDER — LEVOTHYROXINE SODIUM 0.1 MG/1
100 TABLET ORAL DAILY
Qty: 30 TABLET | Refills: 11 | Status: SHIPPED | OUTPATIENT
Start: 2019-02-20 | End: 2020-05-07 | Stop reason: SDUPTHER

## 2019-04-01 ENCOUNTER — OFFICE VISIT (OUTPATIENT)
Dept: INTERNAL MEDICINE | Facility: CLINIC | Age: 39
End: 2019-04-01

## 2019-04-01 VITALS
WEIGHT: 180 LBS | HEART RATE: 72 BPM | HEIGHT: 72 IN | OXYGEN SATURATION: 98 % | SYSTOLIC BLOOD PRESSURE: 129 MMHG | DIASTOLIC BLOOD PRESSURE: 80 MMHG | TEMPERATURE: 97.7 F | BODY MASS INDEX: 24.38 KG/M2

## 2019-04-01 DIAGNOSIS — E03.9 ACQUIRED HYPOTHYROIDISM: ICD-10-CM

## 2019-04-01 DIAGNOSIS — M79.10 TRIGGER POINT: ICD-10-CM

## 2019-04-01 DIAGNOSIS — N50.819 TESTALGIA: Primary | ICD-10-CM

## 2019-04-01 DIAGNOSIS — E78.2 MIXED HYPERLIPIDEMIA: ICD-10-CM

## 2019-04-01 DIAGNOSIS — M54.2 NECK PAIN: ICD-10-CM

## 2019-04-01 PROCEDURE — 99395 PREV VISIT EST AGE 18-39: CPT | Performed by: FAMILY MEDICINE

## 2019-04-01 RX ORDER — CYCLOBENZAPRINE HCL 5 MG
TABLET ORAL
Qty: 30 TABLET | Refills: 2 | Status: SHIPPED | OUTPATIENT
Start: 2019-04-01 | End: 2020-10-08

## 2019-04-01 NOTE — PROGRESS NOTES
Subjective   Babatunde Markham is a 38 y.o. male.     Chief Complaint   Patient presents with   • Annual Exam   Testalgia right testicle, neck pain.      History of Present Illness   Generally healthy gentleman said to turn 39.  He has had 2 weeks of right-sided neck pain occasionally with a tingle down the arm but without profound pain or persistent numbness in the hand or arm there is been no injury but he has some occasional disrupted sleep with young children.  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    Otherwise he has a history of vasectomy 2 years ago and has some intermittent right testalgia occurring for the past 6 weeks.    Otherwise health maintenance issues are reviewed and discussed.  He has follow-up with endocrinology concerning thyroid function thyroid replacement and lipids.      The following portions of the patient's history were reviewed and updated as appropriate: allergies, current medications, past social history and problem list.    Review of Systems   Constitutional: Negative.    HENT: Negative.    Eyes: Negative.    Respiratory: Negative.    Cardiovascular: Negative.    Gastrointestinal: Negative.    Endocrine: Negative.    Genitourinary: Positive for testicular pain.   Musculoskeletal: Positive for neck pain and neck stiffness.   Skin: Negative.    Allergic/Immunologic: Negative.    Neurological: Negative.    Hematological: Negative.    Psychiatric/Behavioral: Negative.        Objective   Vitals:    04/01/19 1256   BP: 129/80   Pulse: 72   Temp: 97.7 °F (36.5 °C)   SpO2: 98%     Physical Exam   Constitutional: He is oriented to person, place, and time. He appears well-developed and well-nourished.   HENT:   Head: Normocephalic and atraumatic.   Right Ear: Tympanic membrane and external ear normal.   Left Ear: Tympanic membrane and external ear normal.   Nose: Nose normal.   Mouth/Throat: Oropharynx is clear and moist.   Eyes: Conjunctivae and EOM are normal. Pupils are equal,  round, and reactive to light.   Neck: Normal range of motion. Neck supple. No JVD present. No thyromegaly present.       Cardiovascular: Normal rate, regular rhythm, normal heart sounds and intact distal pulses.   Pulmonary/Chest: Effort normal and breath sounds normal.   Abdominal: Soft. Bowel sounds are normal.   Genitourinary: Right testis shows tenderness.         Musculoskeletal: Normal range of motion.   Lymphadenopathy:     He has no cervical adenopathy.   Neurological: He is alert and oriented to person, place, and time. No cranial nerve deficit. Coordination normal.   Skin: Skin is warm and dry. No rash noted.   Psychiatric: He has a normal mood and affect. His behavior is normal. Judgment and thought content normal.   Vitals reviewed.      Assessment/Plan   Problem List Items Addressed This Visit        Cardiovascular and Mediastinum    Mixed hyperlipidemia       Endocrine    Acquired hypothyroidism      Other Visit Diagnoses     Testalgia    -  Primary    Relevant Orders    Ambulatory Referral to Urology    Neck pain        Trigger point          Flexeril 5 mg at bedtime as needed for neck spasm.  The neck pain persists we will get an MRI of the cervical spine and soft tissues.  Otherwise need follow-up for hypothyroidism and hyperlipidemia with endocrinology.  Referral back to Dr. Palomino for right-sided testalgia and question of mass..

## 2020-04-28 RX ORDER — LEVOTHYROXINE SODIUM 0.1 MG/1
100 TABLET ORAL DAILY
Qty: 30 TABLET | Refills: 1 | OUTPATIENT
Start: 2020-04-28

## 2020-05-07 RX ORDER — LEVOTHYROXINE SODIUM 0.1 MG/1
100 TABLET ORAL DAILY
Qty: 30 TABLET | Refills: 11 | Status: SHIPPED | OUTPATIENT
Start: 2020-05-07 | End: 2020-08-07 | Stop reason: DRUGHIGH

## 2020-08-05 ENCOUNTER — OFFICE VISIT CONVERTED (OUTPATIENT)
Dept: FAMILY MEDICINE CLINIC | Age: 40
End: 2020-08-05
Attending: NURSE PRACTITIONER

## 2020-08-05 ENCOUNTER — HOSPITAL ENCOUNTER (OUTPATIENT)
Dept: OTHER | Facility: HOSPITAL | Age: 40
Discharge: HOME | End: 2020-08-05
Attending: NURSE PRACTITIONER

## 2020-08-05 LAB
ALBUMIN SERPL-MCNC: 4.7 G/DL (ref 3.5–5)
ALBUMIN/GLOB SERPL: 1.3 {RATIO} (ref 1.4–2.6)
ALP SERPL-CCNC: 61 U/L (ref 53–128)
ALT SERPL-CCNC: 19 U/L (ref 10–40)
AMYLASE SERPL-CCNC: 61 U/L (ref 30–110)
ANION GAP SERPL CALC-SCNC: 18 MMOL/L (ref 8–19)
AST SERPL-CCNC: 20 U/L (ref 15–50)
BASOPHILS # BLD MANUAL: 0.04 10*3/UL (ref 0–0.2)
BASOPHILS NFR BLD MANUAL: 0.5 % (ref 0–3)
BILIRUB SERPL-MCNC: 0.84 MG/DL (ref 0.2–1.3)
BUN SERPL-MCNC: 8 MG/DL (ref 5–25)
BUN/CREAT SERPL: 7 {RATIO} (ref 6–20)
CALCIUM SERPL-MCNC: 10.4 MG/DL (ref 8.7–10.4)
CHLORIDE SERPL-SCNC: 99 MMOL/L (ref 99–111)
CHOLEST SERPL-MCNC: 237 MG/DL (ref 107–200)
CHOLEST/HDLC SERPL: 4.9 {RATIO} (ref 3–6)
CONV CO2: 25 MMOL/L (ref 22–32)
CONV TOTAL PROTEIN: 8.4 G/DL (ref 6.3–8.2)
CREAT UR-MCNC: 1.2 MG/DL (ref 0.7–1.2)
DEPRECATED RDW RBC AUTO: 39 FL
EOSINOPHIL # BLD MANUAL: 0.03 10*3/UL (ref 0–0.7)
EOSINOPHIL NFR BLD MANUAL: 0.4 % (ref 0–7)
ERYTHROCYTE [DISTWIDTH] IN BLOOD BY AUTOMATED COUNT: 12.2 % (ref 11.5–14.5)
GFR SERPLBLD BASED ON 1.73 SQ M-ARVRAT: >60 ML/MIN/{1.73_M2}
GLOBULIN UR ELPH-MCNC: 3.7 G/DL (ref 2–3.5)
GLUCOSE SERPL-MCNC: 104 MG/DL (ref 70–99)
GRANS (ABSOLUTE): 5.47 10*3/UL (ref 2–8)
GRANS: 71.3 % (ref 30–85)
HBA1C MFR BLD: 16.8 G/DL (ref 14–18)
HCT VFR BLD AUTO: 49.2 % (ref 42–52)
HDLC SERPL-MCNC: 48 MG/DL (ref 40–60)
IMM GRANULOCYTES # BLD: 0.01 10*3/UL (ref 0–0.54)
IMM GRANULOCYTES NFR BLD: 0.1 % (ref 0–0.43)
LDLC SERPL CALC-MCNC: 177 MG/DL (ref 70–100)
LIPASE SERPL-CCNC: 27 U/L (ref 5–51)
LYMPHOCYTES # BLD MANUAL: 1.41 10*3/UL (ref 1–5)
LYMPHOCYTES NFR BLD MANUAL: 9.3 % (ref 3–10)
MCH RBC QN AUTO: 29.3 PG (ref 27–31)
MCHC RBC AUTO-ENTMCNC: 34.1 G/DL (ref 33–37)
MCV RBC AUTO: 85.9 FL (ref 80–96)
MONOCYTES # BLD AUTO: 0.71 10*3/UL (ref 0.2–1.2)
OSMOLALITY SERPL CALC.SUM OF ELEC: 285 MOSM/KG (ref 273–304)
PLATELET # BLD AUTO: 315 10*3/UL (ref 130–400)
PMV BLD AUTO: 9.7 FL (ref 7.4–10.4)
POTASSIUM SERPL-SCNC: 4.4 MMOL/L (ref 3.5–5.3)
RBC # BLD AUTO: 5.73 10*6/UL (ref 4.7–6.1)
SODIUM SERPL-SCNC: 138 MMOL/L (ref 135–147)
T4 FREE SERPL-MCNC: 0.8 NG/DL (ref 0.9–1.8)
TRIGL SERPL-MCNC: 59 MG/DL (ref 40–150)
TSH SERPL-ACNC: 14.17 M[IU]/L (ref 0.27–4.2)
VARIANT LYMPHS NFR BLD MANUAL: 18.4 % (ref 20–45)
VLDLC SERPL-MCNC: 12 MG/DL (ref 5–37)
WBC # BLD AUTO: 7.67 10*3/UL (ref 4.8–10.8)

## 2020-08-06 LAB — SARS-COV-2 RNA SPEC QL NAA+PROBE: NOT DETECTED

## 2020-08-07 ENCOUNTER — TELEPHONE (OUTPATIENT)
Dept: ENDOCRINOLOGY | Age: 40
End: 2020-08-07

## 2020-08-07 LAB — T3FREE SERPL-MCNC: 2.6 PG/ML (ref 2–4.4)

## 2020-08-07 RX ORDER — LEVOTHYROXINE SODIUM 0.12 MG/1
TABLET ORAL
Qty: 30 TABLET | Refills: 11 | Status: SHIPPED | OUTPATIENT
Start: 2020-08-07 | End: 2020-10-08

## 2020-08-07 NOTE — TELEPHONE ENCOUNTER
Spoke with pt  He stated her was no feeling will that why he went to his pcp  Had covid testing ran a thyroid panel tsh was elevated  They are suppose to faxing the lab over to us this morning  Want you to review them

## 2020-08-07 NOTE — TELEPHONE ENCOUNTER
Pt called stated he went to his Family Physician because he had not been feeling well. Said he had labs done and the results showed his TSH was very high, and was informed that he needed to see his ENDO.  Pt is requesting a call back.

## 2020-08-07 NOTE — TELEPHONE ENCOUNTER
Spoke with patient about medication change and getting his lab done in 4-6 weeks patient voice understanding

## 2020-09-17 LAB
T4 FREE SERPL-MCNC: 2.26 NG/DL (ref 0.93–1.7)
TSH SERPL DL<=0.005 MIU/L-ACNC: 0.14 UIU/ML (ref 0.27–4.2)

## 2020-10-08 ENCOUNTER — OFFICE VISIT (OUTPATIENT)
Dept: ENDOCRINOLOGY | Age: 40
End: 2020-10-08

## 2020-10-08 VITALS
HEART RATE: 90 BPM | DIASTOLIC BLOOD PRESSURE: 78 MMHG | HEIGHT: 72 IN | BODY MASS INDEX: 21.83 KG/M2 | SYSTOLIC BLOOD PRESSURE: 110 MMHG | OXYGEN SATURATION: 99 % | WEIGHT: 161.2 LBS

## 2020-10-08 DIAGNOSIS — E03.9 ACQUIRED HYPOTHYROIDISM: Primary | ICD-10-CM

## 2020-10-08 DIAGNOSIS — E55.9 VITAMIN D DEFICIENCY: ICD-10-CM

## 2020-10-08 DIAGNOSIS — E04.1 THYROID NODULE: ICD-10-CM

## 2020-10-08 PROCEDURE — 99214 OFFICE O/P EST MOD 30 MIN: CPT | Performed by: INTERNAL MEDICINE

## 2020-10-08 RX ORDER — LEVOTHYROXINE SODIUM 0.1 MG/1
100 TABLET ORAL DAILY
Qty: 30 TABLET | Refills: 11 | Status: SHIPPED | OUTPATIENT
Start: 2020-10-08 | End: 2021-10-25

## 2020-10-08 NOTE — PROGRESS NOTES
39 y.o.    Patient Care Team:  Valentin Shi Jr., MD as PCP - General (Family Medicine)    Chief Complaint:    Follow up/ hypothyroidism  Subjective     HPI    Babatunde SALINAS Rashi 39 y.o. WM presents as a follow up patient for the evaluation of Hypothyroidism. Consulted by .     Patient has been extremely noncompliant in terms of following up with his appointments.  He was last seen by me in 2018.    Hypothyroidism-diagnosed in 2017 on routine physical examination.  Today in clinic patient reports that he is on levothyroxine 125 mcg oral daily.  Takes the medication on empty stomach.    He reports that his energy levels are poor, he lost about 20 pounds of weight-intentional weight loss with diet and exercise and also he reports that he has been under a lot of stress in terms of his personal life-might be going through divorce.  Sleep is disturbed as a result of above, no other significant hyper or hypothyroid symptoms.  Does complain of cold intolerance.  No complaints of shortness of breath, difficulty in swallowing or change in voice.  No family history of thyroid disease.     Thyroid U/S July 2018 - Rt thyroid lobe showed 7 mm nodule.     Reviewed primary care physician's/consulting physician documentation and lab results       Interval History      The following portions of the patient's history were reviewed and updated as appropriate: allergies, current medications, past family history, past medical history, past social history, past surgical history and problem list.    Past Medical History:   Diagnosis Date   • Acquired hypothyroidism    • Acute thyroiditis    • Mixed hyperlipidemia      Family History   Problem Relation Age of Onset   • Heart disease Father    • Heart disease Maternal Grandmother    • Heart disease Maternal Grandfather    • Alzheimer's disease Paternal Grandmother    • Alzheimer's disease Paternal Grandfather      Social History     Socioeconomic History   • Marital status:       "Spouse name: Not on file   • Number of children: Not on file   • Years of education: Not on file   • Highest education level: Not on file   Tobacco Use   • Smoking status: Never Smoker   • Smokeless tobacco: Never Used   Substance and Sexual Activity   • Alcohol use: No     No Known Allergies    Current Outpatient Medications:   •  vitamin D (ERGOCALCIFEROL) 38262 units capsule capsule, Take 1 capsule by mouth 1 (One) Time Per Week., Disp: 30 capsule, Rfl: 11  •  levothyroxine (Synthroid) 100 MCG tablet, Take 1 tablet by mouth Daily., Disp: 30 tablet, Rfl: 11        Review of Systems   Constitutional: Negative for appetite change, fatigue and fever.   Eyes: Negative for visual disturbance.   Respiratory: Negative for shortness of breath.    Cardiovascular: Negative for palpitations and leg swelling.   Gastrointestinal: Negative for abdominal pain and vomiting.   Endocrine: Negative for polydipsia and polyuria.   Musculoskeletal: Negative for joint swelling and neck pain.   Skin: Negative for rash.   Neurological: Negative for weakness and numbness.   Psychiatric/Behavioral: Negative for behavioral problems.     I have reviewed and confirmed the accuracy of the ROS as documented by the MA/LPN/RN Rowdy De La Cruz MD          Objective       Vitals:    10/08/20 1349   BP: 110/78   Pulse: 90   SpO2: 99%   Weight: 73.1 kg (161 lb 3.2 oz)   Height: 182.9 cm (72\")     Body mass index is 21.86 kg/m².      Physical Exam  Vitals signs reviewed.   Constitutional:       Appearance: He is not diaphoretic.   HENT:      Head: Normocephalic and atraumatic.   Eyes:      General: No scleral icterus.     Conjunctiva/sclera: Conjunctivae normal.   Neck:      Thyroid: No thyromegaly.   Cardiovascular:      Rate and Rhythm: Normal rate and regular rhythm.   Pulmonary:      Effort: Pulmonary effort is normal. No respiratory distress.      Breath sounds: Normal breath sounds. No stridor. No wheezing.   Abdominal:      General: Bowel sounds " are normal. There is no distension.      Palpations: Abdomen is soft.      Tenderness: There is no abdominal tenderness.   Musculoskeletal:         General: No deformity.   Lymphadenopathy:      Cervical: No cervical adenopathy.   Skin:     General: Skin is warm and dry.      Findings: No rash.   Neurological:      Mental Status: He is alert and oriented to person, place, and time.         Results Review:     I reviewed the patient's new clinical results and mentioned them above in HPI and in plan as well.    Medical records reviewed  Summary:Done      Orders Only on 09/17/2020   Component Date Value Ref Range Status   • Free T4 09/17/2020 2.26* 0.93 - 1.70 ng/dL Final    Results may be falsely increased if patient taking Biotin.   • TSH 09/17/2020 0.141* 0.270 - 4.200 uIU/mL Final     No results found for: HGBA1C  Lab Results   Component Value Date    CREATININE 1.29 (H) 01/23/2018     Imaging Results (Most Recent)     None                Assessment and Plan:    Babatunde was seen today for hypothyroidism.    Diagnoses and all orders for this visit:    Acquired hypothyroidism  -     TSH; Future  -     T4, Free; Future  -     T3, Free; Future  -     TSH; Future  -     T4, Free; Future  -     T3, Free; Future  -     US Thyroid; Future    Vitamin D deficiency  -     TSH; Future  -     T4, Free; Future  -     T3, Free; Future  -     TSH; Future  -     T4, Free; Future  -     T3, Free; Future  -     US Thyroid; Future  -     Hemoglobin A1c; Future    Thyroid nodule  -     TSH; Future  -     T4, Free; Future  -     T3, Free; Future  -     TSH; Future  -     T4, Free; Future  -     T3, Free; Future  -     US Thyroid; Future    Other orders  -     levothyroxine (Synthroid) 100 MCG tablet; Take 1 tablet by mouth Daily.      Hypothyroidism-levels are not stable   change levothyroxine to 100 mcg oral daily  Repeat the blood work up in 6 weeks from now  Levels are not stable.    Thyroid nodules  Proceed with thyroid  "ultrasound  Reviewed the prior thyroid ultrasound with the patient    Vitamin D deficiency  Continue vitamin D replacement    Emphasized to the patient that he needs to keep up with his appointments.    Reviewed Lab results with the patient.               Rowdy De La Cruz MD  10/08/20    [employer] / transcription disclaimer:     \"Dictated utilizing Dragon dictation\".      "

## 2020-10-09 ENCOUNTER — HOSPITAL ENCOUNTER (OUTPATIENT)
Dept: OTHER | Facility: HOSPITAL | Age: 40
Discharge: HOME | End: 2020-10-09

## 2020-11-18 LAB
HBA1C MFR BLD: 5.5 % (ref 4.8–5.6)
T3FREE SERPL-MCNC: 3.2 PG/ML (ref 2–4.4)
T4 FREE SERPL-MCNC: 1.32 NG/DL (ref 0.93–1.7)
TSH SERPL DL<=0.005 MIU/L-ACNC: 2.5 UIU/ML (ref 0.27–4.2)

## 2020-11-19 ENCOUNTER — RESULTS ENCOUNTER (OUTPATIENT)
Dept: ENDOCRINOLOGY | Age: 40
End: 2020-11-19

## 2020-11-19 DIAGNOSIS — E03.9 ACQUIRED HYPOTHYROIDISM: ICD-10-CM

## 2020-11-19 DIAGNOSIS — E55.9 VITAMIN D DEFICIENCY: ICD-10-CM

## 2020-11-19 DIAGNOSIS — E04.1 THYROID NODULE: ICD-10-CM

## 2021-04-07 ENCOUNTER — OFFICE VISIT CONVERTED (OUTPATIENT)
Dept: FAMILY MEDICINE CLINIC | Age: 41
End: 2021-04-07
Attending: NURSE PRACTITIONER

## 2021-04-07 ENCOUNTER — HOSPITAL ENCOUNTER (OUTPATIENT)
Dept: OTHER | Facility: HOSPITAL | Age: 41
Discharge: HOME | End: 2021-04-07
Attending: NURSE PRACTITIONER

## 2021-05-07 ENCOUNTER — HOSPITAL ENCOUNTER (OUTPATIENT)
Dept: OTHER | Facility: HOSPITAL | Age: 41
Discharge: HOME | End: 2021-05-07
Attending: NURSE PRACTITIONER

## 2021-05-07 ENCOUNTER — OFFICE VISIT CONVERTED (OUTPATIENT)
Dept: FAMILY MEDICINE CLINIC | Age: 41
End: 2021-05-07
Attending: NURSE PRACTITIONER

## 2021-05-10 LAB — B BURGDOR IGG+IGM SER-ACNC: <0.91 ISR (ref 0–0.9)

## 2021-05-12 LAB
B MICROTI DNA BLD QL NAA+PROBE: NOT DETECTED
CONV ANAPLASMA PHAGOCYTOPHILUM PCR: NOT DETECTED
CONV ANTI GALACTOSE ALPHA 1,3 IGE: <0.1 KU/L
CONV BABESIA SPECIES PCR: NOT DETECTED
CONV EHRLICHIA EWINGII CANIS PCR: NOT DETECTED
CONV EHRLICHIA MURIS LIKE PCR: NOT DETECTED
E CHAFFEENSIS DNA BLD QL NAA+PROBE: NOT DETECTED
R RICKETTSI IGG SER QL IA: NEGATIVE
R RICKETTSI IGM TITR SER: 0.1 INDEX (ref 0–0.89)

## 2021-05-18 NOTE — PROGRESS NOTES
Babatunde Markham 1980     Office/Outpatient Visit    Visit Date: Wed, Jul 11, 2018 01:16 pm    Provider: Effie Lima N.P. (Assistant: Mildred Ruiz MA)    Location: [facility]        Electronically signed by Effie Lima N.P. on  07/12/2018 09:35:45 PM                             SUBJECTIVE:        CC:     Job is a 37 year old White male.  Nodule on right side of throat, right shoulder pain, and headache         HPI:         Patient to be evaluated for shoulder pain.  He complains of right shoulder pain.  The location of the pain is anterior.  It does not radiate.  The pain initially started 2 weeks ago.  There was no obvious precipitating injury.  He describes it as mild, intermittent, and aching.  He denies other related joint symptoms.  The pain is relieved with heat.          In regard to the headache, onset was 1 to 2 hours ago.  The location is primarily left posterior.  The pain radiates to the left neck.  He has had prior headaches similar to this one. Typical headache frequency is roughly every few weeks.   The duration of each episode is usually less than 1 hour.   He has not had a formal headache work-up done previously.  He characterizes it as mild and aching.  Associated symptoms include allergy symptoms and stiff neck.  He denies fever, nausea, photophobia or vomiting.  There do not seem to be any factors that worsen the headache.  He has not found anything that lessens the headache.  Pertinent medical history is unremarkable.          Concerning thyroid nodule, intermittent , brief, sharp pain right anterior neck near where he has a known thyroid nodule.  sees endocrinology for hypothyroidism.  due for thyroid US later this year.  last US was april 2017.  would like to go ahead and get thyroid US.  denies sore throat, or acid reflux.  sharp pain with sneeze most often that lasts a couple of seconds.  denies dysphagia.      ROS:     CONSTITUTIONAL:  Negative for chills,  fatigue, fever, and weight change.      E/N/T:  Positive for nasal congestion.   Negative for ear pain, frequent rhinorrhea or sore throat.      CARDIOVASCULAR:  Negative for chest pain, palpitations, tachycardia, orthopnea, and edema.      RESPIRATORY:  Negative for cough, dyspnea, and hemoptysis.      GASTROINTESTINAL:  Negative for abdominal pain, acid reflux symptoms, dysphagia, heartburn, nausea and vomiting.      MUSCULOSKELETAL:  Positive for left neck and right shoulder pain.      NEUROLOGICAL:  Positive for headaches ( unknown type ).   Negative for dizziness, paresthesias or weakness.      ENDOCRINE:  Negative for hair loss, heat/cold intolerance, polydipsia, and polyphagia.      PSYCHIATRIC:  Negative for anxiety, depression, and sleep disturbances.          PMH/FMH/SH:     Last Reviewed on 1/16/2018 03:50 PM by Donald Mauricio    Past Medical History:     UNREMARKABLE     Hospitalizations: Never         Surgical History:     NONE         Family History:         Positive for Coronary Artery Disease and Hypertension.          Social History:     Occupation: ;     Marital Status:      Children: 3 children         Tobacco/Alcohol/Supplements:     Last Reviewed on 1/29/2018 08:59 AM by Ioana Colby    Tobacco: He has never smoked.          Alcohol: When he drinks, the average quantity of alcohol is 1-2 drinks.   He typically consumes beer.          Substance Abuse History:     Last Reviewed on 1/16/2018 03:50 PM by Donald Mauricio    NEGATIVE         Mental Health History:     Last Reviewed on 1/16/2018 03:50 PM by Donald Mauricio    NEGATIVE         Communicable Diseases (eg STDs):     Last Reviewed on 1/16/2018 03:50 PM by Donald Mauricio            Current Problems:     Last Reviewed on 1/16/2018 03:50 PM by Donald Mauricio    Primary hypothyroidism     GERD     Frequent urination         Immunizations:     DTP  (Diphtheria-Tetanus-whole cell  Pertussis) 1980     DTP  (Diphtheria-Tetanus-whole cell Pertussis) 2/29/1980     DTP  (Diphtheria-Tetanus-whole cell Pertussis) 5/27/1980     DTP  (Diphtheria-Tetanus-whole cell Pertussis) 5/7/1981     DTP  (Diphtheria-Tetanus-whole cell Pertussis) 4/11/1986     Td adult 6/8/1994     Hep B (pedi/adol, 3-dose schedule) 8/18/1995     Hep B (pedi/adol, 3-dose schedule) 9/29/1995     Hep B (pedi/adol, 3-dose schedule) 3/29/1996     OPV  Poliovirus, live (oral) 1980     OPV  Poliovirus, live (oral) 2/29/1980     OPV  Poliovirus, live (oral) 5/7/1981     OPV  Poliovirus, live (oral) 4/11/1986     MMR  (Measles-Mumps-Rubella), live 1/13/1981     MMR  (Measles-Mumps-Rubella), live 9/11/1991     zzFluzone pf-quadrivalent 3 and up 10/1/2017     FluMist 11/5/2007     FluMist 10/11/2010     FluMist 12/21/2011     FluMist 10/10/2012     Flumist Quadrivalent 9/5/2013     Flumist Quadrivalent 9/27/2013     Influenza A (H1N1) NASAL, Monovalent Live 10/28/2009     PPD 6/6/2012     PPD 6/6/2013     Adacel (Tdap) 12/21/2011         Allergies:     Last Reviewed on 1/29/2018 08:59 AM by Ioana Colby      No Known Drug Allergies.         Current Medications:     Last Reviewed on 7/11/2018 01:20 PM by Mildred uRiz HFA 90mcg/1actuation Oral Inhaler Inhale 2 puff(s) by mouth 4 times a day as needed     Levothyroxine Sodium 0.1mg Tablet Take 1 tablet(s) by mouth daily     Ibuprofen     Sudafed         OBJECTIVE:        Vitals:         Historical:     01/29/2018  BP:   139/85 mm Hg ( (left arm, , sitting, );)     01/29/2018  Wt:   177lbs        Current: 7/11/2018 1:18:07 PM    Ht:  6 ft, 0.75 in;  Wt: 177.6 lbs;  BMI: 23.6    T: 98.1 F (oral);  BP: 140/81 mm Hg (left arm, sitting);  P: 83 bpm (left arm (BP Cuff), sitting);  sCr: 1.14 mg/dL;  GFR: 86.51        Exams:     PHYSICAL EXAM:     GENERAL:  well developed and nourished; appropriately groomed; in no apparent distress;     E/N/T: EARS: bilateral TMs are  normal;  NOSE: normal nasal mucosa; OROPHARYNX: posterior pharynx, including tonsils, tongue, and uvula are normal;     NECK: thyroid exam reveals a discrete <1/2 cm (approx.) nodule in the right lobe;     RESPIRATORY: normal respiratory rate and pattern with no distress; normal breath sounds with no rales, rhonchi, wheezes or rubs;     CARDIOVASCULAR: normal rate; rhythm is regular;     LYMPHATIC: no enlargement of cervical or facial nodes;     MUSCULOSKELETAL: decreased range of motion noted in: neck rotation;  pain with range of motion in: neck rotation;  right shoulder extension;     NEUROLOGIC: mental status: alert and oriented x 3; GROSSLY INTACT     PSYCHIATRIC:  appropriate affect and demeanor; normal speech pattern; grossly normal memory;         ASSESSMENT           719.41   M25.519  Shoulder pain              DDx:     784.0   R51  Headache              DDx:     723.1   M54.2  Neck pain              DDx:     241.0   E04.1  Thyroid nodule              DDx:         ORDERS:         Meds Prescribed:       Meloxicam 7.5mg Tablet 1 bid prn pain.  take with food.  do not take with tylenol, ibuprofen, or other NSAIDs.  #30 (Thirty) tablet(s) Refills: 0       Baclofen 10mg Tablet 1 Tablet at bedtime PRN muscle spasm  #30 (Thirty) tablet(s) Refills: 0         Radiology/Test Orders:       [id number]  US, soft tissues of head & neck (eg, thyroid, parathyroid, parotid), real time w/image documentation  (Send-Out)                   PLAN:          Shoulder pain         RECOMMENDATIONS given include: some mild tenderness noted right subacromial.  possible bursitis.  advise nsaid, rest, heat.  has been lifting weights at the gym until one week ago.  avoid weight lifting for now.  ok to use treadmill or weight train lower body until symptoms resolve.  consider right shoulder xray and / or PT if not improving..           Headache         has noted tension of left posterioir neck muscle that appears .  symptomatic tx.  massage.   is also treating allergy symptoms.  no sinus tenderness.  observe.  to ER if worsens.  follow up if not improving.            Patient Education Handouts:       Tension Headache           Neck pain         RECOMMENDATIONS given include: baclofen may cause drowsiness.  cervical spine xray if not improving..            Prescriptions:       Meloxicam 7.5mg Tablet 1 bid prn pain.  take with food.  do not take with tylenol, ibuprofen, or other NSAIDs.  #30 (Thirty) tablet(s) Refills: 0       Baclofen 10mg Tablet 1 Tablet at bedtime PRN muscle spasm  #30 (Thirty) tablet(s) Refills: 0          Thyroid nodule         RADIOLOGY:  I have ordered Thyroid Ultrasound to be done today.            Orders:       [id number]  US, soft tissues of head & neck (eg, thyroid, parathyroid, parotid), real time w/image documentation  (Send-Out)               CHARGE CAPTURE           **Please note: ICD descriptions below are intended for billing purposes only and may not represent clinical diagnoses**        Primary Diagnosis:         719.41 Shoulder pain            M25.519    Pain in unspecified shoulder              Orders:          10006   Office/outpatient visit; established patient, level 3  (In-House)           784.0 Headache            R51    Headache    723.1 Neck pain            M54.2    Cervicalgia    241.0 Thyroid nodule            E04.1    Nontoxic single thyroid nodule

## 2021-05-18 NOTE — PROGRESS NOTES
Babatunde Markham 1980     Office/Outpatient Visit    Visit Date: Tue, Jan 16, 2018 03:42 pm    Provider: Donald Mauricio MD (Assistant: Iveth Greer MA)    Location: [facility]        Electronically signed by Donald Mauricio MD on  01/17/2018 05:57:01 AM                             SUBJECTIVE:        CC: shortness of breath         HPI:     Job is in today for follow up on shortness of breath.  He says that he has noted this the last couple of days.  He is not sure what has caused this.  He feels some tightness on the right side of his chest with deep breath.  He has had a similar issue in the past that was more short-lived.  However, this does not seem to be resolving.  He says that he is in good overall health, but he does have an underactive thyroid and is currently on levothyroxine.     ROS:     CONSTITUTIONAL:  Negative for chills and fever.      E/N/T:  Positive for nasal congestion.      CARDIOVASCULAR:  Negative for chest pain and palpitations.      RESPIRATORY:  Negative for recent cough.      GASTROINTESTINAL:  Negative for abdominal pain, nausea and vomiting.      INTEGUMENTARY:  Negative for atypical mole(s) and rash.          PMH/FMH/SH:     Last Reviewed on 1/16/2018 03:50 PM by Donald Mauricio    Past Medical History:     UNREMARKABLE     Hospitalizations: Never         Surgical History:     NONE         Family History:         Positive for Coronary Artery Disease and Hypertension.          Social History:     Occupation: ;     Marital Status:      Children: 3 children         Tobacco/Alcohol/Supplements:     Last Reviewed on 1/16/2018 03:50 PM by Donald Mauricio    Tobacco: He has never smoked.          Alcohol: When he drinks, the average quantity of alcohol is 1-2 drinks.   He typically consumes beer.          Substance Abuse History:     Last Reviewed on 1/16/2018 03:50 PM by Donald Mauricio    NEGATIVE         Mental Health History:      Last Reviewed on 1/16/2018 03:50 PM by Donald Mauricio    NEGATIVE         Communicable Diseases (eg STDs):     Last Reviewed on 1/16/2018 03:50 PM by Donald Mauricio            Current Problems:     Last Reviewed on 1/16/2018 03:50 PM by Donald Mauricio    Primary hypothyroidism     GERD     Frequent urination     Dyspnea         Immunizations:     DTP  (Diphtheria-Tetanus-whole cell Pertussis) 1980     DTP  (Diphtheria-Tetanus-whole cell Pertussis) 2/29/1980     DTP  (Diphtheria-Tetanus-whole cell Pertussis) 5/27/1980     DTP  (Diphtheria-Tetanus-whole cell Pertussis) 5/7/1981     DTP  (Diphtheria-Tetanus-whole cell Pertussis) 4/11/1986     Td adult 6/8/1994     Hep B (pedi/adol, 3-dose schedule) 8/18/1995     Hep B (pedi/adol, 3-dose schedule) 9/29/1995     Hep B (pedi/adol, 3-dose schedule) 3/29/1996     OPV  Poliovirus, live (oral) 1980     OPV  Poliovirus, live (oral) 2/29/1980     OPV  Poliovirus, live (oral) 5/7/1981     OPV  Poliovirus, live (oral) 4/11/1986     MMR  (Measles-Mumps-Rubella), live 1/13/1981     MMR  (Measles-Mumps-Rubella), live 9/11/1991     FluMist 11/5/2007     FluMist 10/11/2010     FluMist 12/21/2011     FluMist 10/10/2012     Flumist Quadrivalent 9/5/2013     Flumist Quadrivalent 9/27/2013     Influenza A (H1N1) NASAL, Monovalent Live 10/28/2009     PPD 6/6/2012     PPD 6/6/2013     Adacel (Tdap) 12/21/2011         Allergies:     Last Reviewed on 1/16/2018 03:50 PM by Donald Mauricio      No Known Drug Allergies.         Current Medications:     Last Reviewed on 1/16/2018 03:50 PM by Donald Mauricio    Fluticasone Furoate 27.5mcg/1spray Nasal Spray     Ibuprofen     Sudafed     Levothyroxine Sodium 0.088mg Tablet         OBJECTIVE:        Vitals:         Current: 1/16/2018 3:44:38 PM    Ht:  6 ft, 0.75 in;  Wt: 177.6 lbs;  BMI: 23.6    T: 97 F (oral);  BP: 125/73 mm Hg (left arm, sitting);  P: 74 bpm (left arm (BP Cuff), sitting);  sCr:  1.14 mg/dL;  GFR: 86.51    O2 Sat: 98 % (room air)        Exams:     PHYSICAL EXAM:     GENERAL: Vitals recorded well developed, well nourished;     EYES: extraocular movements intact; conjunctiva and cornea are normal; PERRL;     E/N/T: EARS:  normal external auditory canals and tympanic membranes;  grossly normal hearing; NOSE: turbinates are moderately swollen on the right and L side appears normal;  OROPHARYNX:  normal mucosa, dentition, gingiva, and posterior pharynx;     NECK: range of motion is normal; thyroid is non-palpable;     RESPIRATORY: normal respiratory rate and pattern with no distress; normal breath sounds with no rales, rhonchi, wheezes or rubs;     CARDIOVASCULAR: normal rate; rhythm is regular;  no systolic murmur;     GASTROINTESTINAL: nontender; normal bowel sounds; no masses;     SKIN:  no significant rashes or lesions; no suspicious moles;         Lab/Test Results:         LABORATORY RESULTS: EKG performed by tls         ASSESSMENT           786.05   R06.02  Dyspnea              DDx:         ORDERS:         Meds Prescribed:       Ventolin HFA (Albuterol) 90mcg/1actuation Oral Inhaler Inhale 2 puff(s) by mouth 4 times a day as needed  #1 (One) inhaler(s) Refills: 1         Radiology/Test Orders:       [id number]  Electrocardiogram, routine with at least 12 leads; with interpretation and report  (In-House)         67292  Chest x-ray, two views, frontal and lateral  (Send-Out)           Other Orders:       [id number]  Noninvasive ear or pulse oximetry for oxygen saturation; single determination  (In-House)                   PLAN:          Dyspnea         RADIOLOGY:  I have ordered a chest x-ray (PA and lateral) to be done today.      TESTS/PROCEDURES:  Will proceed with an ECG and Pulse Oximetry (O2 SAT) to be performed/scheduled now.      RECOMMENDATIONS given include: There is not any focal finding that seems concerning.  His EKG is essentially normal, and the oxygen saturation is normal.  This may be  some kind of bronchoconstriction.  We will get an X-ray as a caution and give him an inhaler for use in the near term.  If he does not see this improve, we may consider other evaluation..            Prescriptions:       Ventolin HFA (Albuterol) 90mcg/1actuation Oral Inhaler Inhale 2 puff(s) by mouth 4 times a day as needed  #1 (One) inhaler(s) Refills: 1           Orders:       [id number]  Electrocardiogram, routine with at least 12 leads; with interpretation and report  (In-House)         19035  Noninvasive ear or pulse oximetry for oxygen saturation; single determination  (In-House)         23757  Chest x-ray, two views, frontal and lateral  (Send-Out)             Patient Education Handouts:       [facility] Medication Compliance              CHARGE CAPTURE           **Please note: ICD descriptions below are intended for billing purposes only and may not represent clinical diagnoses**        Primary Diagnosis:         786.05 Dyspnea            R06.02    Shortness of breath              Orders:          19184   Office/outpatient visit; established patient, level 4  (In-House)             78664   Electrocardiogram, routine with at least 12 leads; with interpretation and report  (In-House)             13890   Noninvasive ear or pulse oximetry for oxygen saturation; single determination  (In-House)

## 2021-05-18 NOTE — PROGRESS NOTES
"Babatunde Markham  1980     Office/Outpatient Visit    Visit Date: Wed, Aug 5, 2020 09:05 am    Provider: Yuki Dowd N.P. (Assistant: Mallorie Padilla MA)    Location: [facility]        Electronically signed by Yuki Dowd N.P. on  08/05/2020 11:13:29 AM                             Subjective:        CC: Job is a 39 year old White male.  Noticed decrease in appetite since Monday. He has had body aches and diarrhea         HPI: 39-year-old male presenting to clinic complaining of diarrhea that started approximately 2 to 3 days ago.  He states he has had a decreased appetite as well.  He states when he tries to eat, he does become slightly nauseated.  He denies fever.  He states he did have generalized malaise but that has subsided.  He also had chills.  Patient has not been to endocrinologist in over a year due to COVID pandemic.  He states he has not been compliant with thyroid medication. 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    ROS:     CONSTITUTIONAL:  Negative for chills, fatigue and fever.      EYES:  Negative for blurred vision.      E/N/T:  Negative for ear pain, nasal congestion, frequent rhinorrhea and sore throat.      CARDIOVASCULAR:  Negative for chest pain, dizziness and palpitations.      RESPIRATORY:  Negative for recent cough and dyspnea.      GASTROINTESTINAL:  Positive for abdominal pain ( diffuse; \"Cramping\" ) and diarrhea.   Negative for constipation or vomiting.      GENITOURINARY:  Negative for dysuria and change in urine stream.      MUSCULOSKELETAL:  Negative for arthralgias.      INTEGUMENTARY:  Negative for rash.      NEUROLOGICAL:  Positive for headaches ( States he thinks it may be due to lack of caffeine as he has not been drinking soft drinks while sick ).   Negative for paresthesias or weakness.      PSYCHIATRIC:  Negative for anxiety, depression and sleep disturbance.          Past Medical History / " Family History / Social History:         Last Reviewed on 8/05/2020 09:14 AM by Yuki Dowd    Past Medical History:             PAST MEDICAL HISTORY         Fracture(s): right radius may 2020;     Hypothyroidism: dx'd in 2017;     Hospitalizations: Never         CURRENT MEDICAL PROVIDERS: Endocrinologist         Surgical History:     NONE         Family History:         Positive for Coronary Artery Disease and Hypertension.          Social History:     Occupation: medical sales;     Marital Status:      Children: 3 children         Tobacco/Alcohol/Supplements:     Last Reviewed on 8/05/2020 09:14 AM by Yuki Dowd    Tobacco: He has never smoked.          Alcohol: When he drinks, the average quantity of alcohol is 1-2 drinks.   He typically consumes beer.          Substance Abuse History:     Last Reviewed on 8/05/2020 09:14 AM by Yuki Dowd    NEGATIVE         Mental Health History:     Last Reviewed on 8/05/2020 09:14 AM by Yuki Dowd    NEGATIVE         Communicable Diseases (eg STDs):     Last Reviewed on 1/16/2018 03:50 PM by Donald Mauricio        Immunizations:     DTP  (Diphtheria-Tetanus-whole cell Pertussis) 1980    DTP  (Diphtheria-Tetanus-whole cell Pertussis) 2/29/1980    DTP  (Diphtheria-Tetanus-whole cell Pertussis) 5/27/1980    DTP  (Diphtheria-Tetanus-whole cell Pertussis) 5/7/1981    DTP  (Diphtheria-Tetanus-whole cell Pertussis) 4/11/1986    Td adult 6/8/1994    Hep B (pedi/adol, 3-dose schedule) 8/18/1995    Hep B (pedi/adol, 3-dose schedule) 9/29/1995    Hep B (pedi/adol, 3-dose schedule) 3/29/1996    OPV  Poliovirus, live (oral) 1980    OPV  Poliovirus, live (oral) 2/29/1980    OPV  Poliovirus, live (oral) 5/7/1981    OPV  Poliovirus, live (oral) 4/11/1986    MMR  (Measles-Mumps-Rubella), live 1/13/1981    MMR  (Measles-Mumps-Rubella), live 9/11/1991    zzFluzone pf-quadrivalent 3 and up 10/1/2017    FluMist 11/5/2007    FluMist 10/11/2010     FluMist 12/21/2011    FluMist 10/10/2012    Flumist Quadrivalent 9/5/2013    Flumist Quadrivalent 9/27/2013    Influenza A (H1N1) NASAL, Monovalent Live 10/28/2009    PPD 6/6/2012    PPD 6/6/2013    Adacel (Tdap) 12/21/2011        Allergies:     Last Reviewed on 8/05/2020 09:14 AM by Yuki Dowd    No Known Allergies.        Current Medications:     Last Reviewed on 8/05/2020 09:14 AM by Yuki Dowd    Ibuprofen     Sudafed     Levothyroxine Sodium 0.1mg Tablet [Take 1 tablet(s) by mouth daily]    Ventolin HFA 90mcg/1actuation Oral Inhaler [Inhale 2 puff(s) by mouth 4 times a day as needed]        Objective:        Vitals:         Current: 8/5/2020 9:09:08 AM    Ht:  6 ft, 0.75 in;  Wt: 171.8 lbs;  BMI: 22.8T: 97.4 F (temporal);  BP: 127/92 mm Hg (left arm, sitting);  P: 81 bpm (left arm (BP Cuff), sitting);  sCr: 1.14 mg/dL;  GFR: 83.66        Exams:     PHYSICAL EXAM:     GENERAL: Vitals recorded well developed, well nourished;  well groomed;  no apparent distress;     EYES: extraocular movements intact; conjunctiva and cornea are normal; PERRLA;     NECK: range of motion is normal; thyroid exam reveals not enlarged;  carotid exam is normal with good upstroke and no bruits;     RESPIRATORY: normal respiratory rate and pattern with no distress; normal breath sounds with no rales, rhonchi, wheezes or rubs;     CARDIOVASCULAR: normal rate; rhythm is regular;  no systolic murmur; no edema;     GASTROINTESTINAL: nontender; normal bowel sounds; no masses; no abdominal hernias;     LYMPHATIC: no enlargement of cervical or facial nodes; no supraclavicular nodes;     SKIN:  no rash; no jaundice, good turgor;     MUSCULOSKELETAL: normal gait; normal overall tone     NEUROLOGIC: mental status: alert and oriented x 3;     PSYCHIATRIC:  appropriate affect and demeanor; normal speech pattern; grossly normal memory;         Assessment:         M79.10   Myalgia, unspecified site       E04.1   Nontoxic single thyroid  nodule           ORDERS:         Meds Prescribed:       [New Rx] dicyclomine 20 mg oral tablet [take 1 tablet (20 mg) by oral route 3 times per day], #30 (thirty) tablets, Refills: 0 (zero)         Radiology/Test Orders:       [id number]  COVID 19 Testing [facility]  (Send-Out)              Lab Orders:       [id number]  BDCBC - H CBC with 3 part diff  (Send-Out)            01765  COMP - [facility] Comp. Metabolic Panel  (Send-Out)            68489  LPDP - [facility] Lipid Panel  (Send-Out)            30318  THYII - [facility] Thyroid panel with TSH (37970, 97061)  (Send-Out)            98551  AMYS - [facility] Amylase, Serum  (Send-Out)            32881  LIP - [facility] Lipase, Serum  (Send-Out)            64661  HCBPA - [facility] - H PYLORI SERUM AB  (Send-Out)                      Plan:         Myalgia, unspecified siteBentyl as needed for abdominal cramping.  Patient states have Zofran at home to be used as needed.  He will contact office if more Zofran as needed.  Increase fluid intake and rest.  Tylenol/ibuprofen for discomfort/fever.  Will notify patient of all lab results and COVID testing.  Patient to return to clinic with any acute concerns or issues.  Patient agrees with plan of care, verbalizes understanding has no further questions upon discharge.          Prescriptions:       [New Rx] dicyclomine 20 mg oral tablet [take 1 tablet (20 mg) by oral route 3 times per day], #30 (thirty) tablets, Refills: 0 (zero)           Orders:       01474  COVID 19 Testing [facility]  (Send-Out)              Nontoxic single thyroid noduleEncouraged compliance with medication.  Will notify patient of results.  Patient verbalizes understanding.    LABORATORY:  Labs ordered to be performed today include amylase, CBC, Comprehensive metabolic panel, lipid panel, Lipase, and Thyroid Panel.            Orders:       [id number]  BDCBC - [facility] CBC with 3 part diff  (Send-Out)            63271  COMP - [facility] Comp. Metabolic Panel  (Send-Out)            80891  LPDP - [facility] Lipid Panel  (Send-Out)             96342  THYII - [facility] Thyroid panel with TSH (23015, 20062)  (Send-Out)            95218  AMYS - [facility] Amylase, Serum  (Send-Out)            76274  LIP - [facility] Lipase, Serum  (Send-Out)            29627  HCBPA - [facility] - H PYLORI SERUM AB  (Send-Out)                  Charge Capture:         Primary Diagnosis:     M79.10  Myalgia, unspecified site           Orders:      88807  Office/outpatient visit; established patient, level 4  (In-House)              E04.1  Nontoxic single thyroid nodule

## 2021-05-18 NOTE — PROGRESS NOTES
Babatunde Markham  1980     Office/Outpatient Visit    Visit Date: Fri, May 7, 2021 01:37 pm    Provider: Yuki Dowd N.P. (Assistant: Angella Griffin MA)    Location: [facility]        Electronically signed by Yuki Dowd N.P. on  05/07/2021 02:57:57 PM                             Subjective:        CC: Job is a 40 year old White male.  Patient presents today with complaints of red bumps all around body, states he pulled a few ticks off of him         HPI: 39 y/o male presenting to office c/o rash diffusely to body x 4-5 days. They do not itch. He also went camping 3 weekends ago and pulled off 4 different ticks within 72 hours. Sites are still present and slightly red and pruritic. Otherwise normal. No neuro complaints. No one else in household has rash. No new detergents/soaps etc.    ROS:     CONSTITUTIONAL:  Negative for chills, fatigue and fever.      EYES:  Negative for blurred vision.      CARDIOVASCULAR:  Negative for chest pain, dizziness, palpitations and pedal edema.      RESPIRATORY:  Negative for recent cough and dyspnea.      GASTROINTESTINAL:  Negative for diarrhea, nausea and vomiting.      MUSCULOSKELETAL:  Negative for arthralgias and myalgias.      NEUROLOGICAL:  Negative for headaches, paresthesias and weakness.          Past Medical History / Family History / Social History:         Last Reviewed on 5/07/2021 01:59 PM by Yuki Dowd    Past Medical History:             PAST MEDICAL HISTORY         Fracture(s): right radius may 2020;     Hypothyroidism: dx'd in 2017;     Hospitalizations: Never         CURRENT MEDICAL PROVIDERS: Endocrinologist         Surgical History:     NONE         Family History:         Positive for Coronary Artery Disease and Hypertension.          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          Alcohol: When he drinks, the average quantity of alcohol is 1-2 drinks.   He typically consumes beer.          Substance Abuse History:     Last Reviewed on 5/07/2021 01:59 PM by Yuki Dowd    NEGATIVE         Mental Health History:     Last Reviewed on 5/07/2021 01:59 PM by Yuki Dwod    NEGATIVE         Communicable Diseases (eg STDs):     Last Reviewed on 5/07/2021 01:59 PM by Yuki Dowd    Reportable health conditions; NEGATIVE         Immunizations:     influenza, injectable, quadrivalent 12/13/2020    SARS-COV-2 (COVID-19) vaccine, UNSPECIFIED 2/17/2021    SARS-COV-2 (COVID-19) vaccine, UNSPECIFIED 3/16/2021    DTP  (Diphtheria-Tetanus-whole cell Pertussis) 1980    DTP  (Diphtheria-Tetanus-whole cell Pertussis) 2/29/1980    DTP  (Diphtheria-Tetanus-whole cell Pertussis) 5/27/1980    DTP  (Diphtheria-Tetanus-whole cell Pertussis) 5/7/1981    DTP  (Diphtheria-Tetanus-whole cell Pertussis) 4/11/1986    Td adult 6/8/1994    Hep B (pedi/adol, 3-dose schedule) 8/18/1995    Hep B (pedi/adol, 3-dose schedule) 9/29/1995    Hep B (pedi/adol, 3-dose schedule) 3/29/1996    OPV  Poliovirus, live (oral) 1980    OPV  Poliovirus, live (oral) 2/29/1980    OPV  Poliovirus, live (oral) 5/7/1981    OPV  Poliovirus, live (oral) 4/11/1986    MMR  (Measles-Mumps-Rubella), live 1/13/1981    MMR  (Measles-Mumps-Rubella), live 9/11/1991    zzFluzone pf-quadrivalent 3 and up 10/1/2017    FluMist 11/5/2007    FluMist 10/11/2010    FluMist 12/21/2011    FluMist 10/10/2012    Flumist Quadrivalent 9/5/2013    Flumist Quadrivalent 9/27/2013    Influenza A (H1N1) NASAL, Monovalent Live 10/28/2009    PPD 6/6/2012    PPD 6/6/2013    Adacel (Tdap) 12/21/2011        Allergies:     Last Reviewed on 5/07/2021 01:59 PM by Yuki Dowd    No Known Allergies.        Current Medications:     Last Reviewed on 5/07/2021 01:59  PM by Yuki Dowd    Ibuprofen     Sudafed     levothyroxine 100 mcg oral tablet [Take 1 tablet(s) by mouth daily]    Ventolin HFA 90mcg/1actuation Oral Inhaler [Inhale 2 puff(s) by mouth 4 times a day as needed]    dicyclomine 20 mg oral tablet [take 1 tablet (20 mg) by oral route 3 times per day]    HYDROcodone-acetaminophen 5-325 mg oral tablet [take 1 tablet by oral route every 4 hours as needed for pain]        Objective:        Vitals:         Current: 5/7/2021 1:52:14 PM    Ht:  6 ft, 0.75 in;  Wt: 169.2 lbs;  BMI: 22.5T: 97.1 F (temporal);  BP: 126/75 mm Hg (left arm, sitting);  P: 80 bpm (left arm (BP Cuff), sitting);  sCr: 1.2 mg/dL;  GFR: 78.20        Exams:     PHYSICAL EXAM:     GENERAL: Vitals recorded well developed, well nourished;  well groomed;  no apparent distress;     EYES: extraocular movements intact; conjunctiva and cornea are normal; PERRLA;     RESPIRATORY: normal respiratory rate and pattern with no distress; normal breath sounds with no rales, rhonchi, wheezes or rubs;     CARDIOVASCULAR: normal rate; rhythm is regular;  no systolic murmur; no edema;     LYMPHATIC: no enlargement of cervical or facial nodes;     SKIN:  no rash; no jaundice, good turgor;     MUSCULOSKELETAL: normal gait; normal overall tone     NEUROLOGIC: mental status: alert and oriented x 3;         Assessment:         R21   Rash and other nonspecific skin eruption           ORDERS:         Meds Prescribed:       [New Rx] doxycycline monohydrate 100 mg oral capsule [take 1 capsule (100 mg) by oral route 2 times per day], #20 (twenty) capsules, Refills: 0 (zero)       [New Rx] predniSONE 20 mg oral tablet [take 1 tab (20mg )  by oral route once daily], #5 (five) tablets, Refills: 0 (zero)         Lab Orders:       [id number]  [facility] Lyme Ab/Western Blot Reflex  (Send-Out)            29908  TBDP [facility] Tick Borne Disease Panel 83380, 88425  (Send-Out)            99789  Rickettsia antibody  (Send-Out)            12509   ABGAL - [facility] ALPHA13 GLACTOSE TEST ALPHA-GAL  (Send-Out)                      Plan:         Rash and other nonspecific skin eruptionWill treat tick bites with abx, especially since one was not removed for at least 72 hours. Will treat diffuse rash with steroid. Can continue using otc hydrocortisone cream for pruritis. Will notify pt of results. Pt to f/u with office as needed. Pt v/u and had no further questions upon d/c.     LABORATORY:  Labs ordered to be performed today include Lyme Ab/Western Blot Reflex and Tick Born Disease Panel.            Prescriptions:       [New Rx] doxycycline monohydrate 100 mg oral capsule [take 1 capsule (100 mg) by oral route 2 times per day], #20 (twenty) capsules, Refills: 0 (zero)       [New Rx] predniSONE 20 mg oral tablet [take 1 tab (20mg )  by oral route once daily], #5 (five) tablets, Refills: 0 (zero)           Orders:       [id number]  LYSCR - [facility] Lyme Ab/Western Blot Reflex  (Send-Out)            24694  TBDP [facility] Tick Borne Disease Panel 07072, 01721  (Send-Out)            91882  Rickettsia antibody  (Send-Out)            82424  ABGAL - [facility] ALPHA13 GLACTOSE TEST ALPHA-GAL  (Send-Out)                  Charge Capture:         Primary Diagnosis:     R21  Rash and other nonspecific skin eruption           Orders:      09974  Office/outpatient visit; established patient, level 3  (In-House)

## 2021-05-18 NOTE — PROGRESS NOTES
Babatunde Markham 1980     Office/Outpatient Visit    Visit Date: Mon, Jan 29, 2018 08:52 am    Provider: Piedad Kincaid N.P. (Assistant: Ioana Colby MA)    Location: [facility]        Electronically signed by Piedad Kincaid N.P. on  01/30/2018 06:52:17 PM                             SUBJECTIVE:        CC:     Job is a 37 year old White male.  body aches, cough, gallbladder issues         HPI: Job reports that he was evaluated 1/16/18 by Dr. Mauricio for c/o chest pain radiating down to abdomen. ECG and chest xray normal. He was then seen at [facility] on 1/24/18. He had CT scan of abdomen and RUQ US that were normal. Last night he also developed cough and congestion and body aches.  Has taken Advil cold and sinus for symptoms.                         ROS:     CONSTITUTIONAL:  Positive for chills and fever ( low grade ).      EYES:  Negative for blurred vision and eye drainage.      E/N/T:  Positive for nasal congestion.   Negative for ear pain or sore throat.      CARDIOVASCULAR:  Positive for chest pain ( just with coughing now ).   Negative for palpitations.      RESPIRATORY:  Positive for recent cough and dyspnea ( has improved as well ).   Negative for frequent wheezing.      GASTROINTESTINAL:  Positive for abdominal pain.   Negative for vomiting.      GENITOURINARY:  Negative for dysuria and polyuria.          PMH/FMH/SH:     Last Reviewed on 1/16/2018 03:50 PM by Donald Mauricio    Past Medical History:     UNREMARKABLE     Hospitalizations: Never         Surgical History:     NONE         Family History:         Positive for Coronary Artery Disease and Hypertension.          Social History:     Occupation: ;     Marital Status:      Children: 3 children         Tobacco/Alcohol/Supplements:     Last Reviewed on 1/16/2018 03:50 PM by Donald Mauricio    Tobacco: He has never smoked.          Alcohol: When he drinks, the average quantity of alcohol is 1-2  drinks.   He typically consumes beer.          Substance Abuse History:     Last Reviewed on 1/16/2018 03:50 PM by Donald Mauricio    NEGATIVE         Mental Health History:     Last Reviewed on 1/16/2018 03:50 PM by Donald Mauricio    NEGATIVE         Communicable Diseases (eg STDs):     Last Reviewed on 1/16/2018 03:50 PM by Donald Mauricio            Current Problems:     Last Reviewed on 1/16/2018 03:50 PM by Donald Mauricio    Primary hypothyroidism     GERD     Frequent urination     Dyspnea         Immunizations:     DTP  (Diphtheria-Tetanus-whole cell Pertussis) 1980     DTP  (Diphtheria-Tetanus-whole cell Pertussis) 2/29/1980     DTP  (Diphtheria-Tetanus-whole cell Pertussis) 5/27/1980     DTP  (Diphtheria-Tetanus-whole cell Pertussis) 5/7/1981     DTP  (Diphtheria-Tetanus-whole cell Pertussis) 4/11/1986     Td adult 6/8/1994     Hep B (pedi/adol, 3-dose schedule) 8/18/1995     Hep B (pedi/adol, 3-dose schedule) 9/29/1995     Hep B (pedi/adol, 3-dose schedule) 3/29/1996     OPV  Poliovirus, live (oral) 1980     OPV  Poliovirus, live (oral) 2/29/1980     OPV  Poliovirus, live (oral) 5/7/1981     OPV  Poliovirus, live (oral) 4/11/1986     MMR  (Measles-Mumps-Rubella), live 1/13/1981     MMR  (Measles-Mumps-Rubella), live 9/11/1991     FluMist 11/5/2007     FluMist 10/11/2010     FluMist 12/21/2011     FluMist 10/10/2012     Flumist Quadrivalent 9/5/2013     Flumist Quadrivalent 9/27/2013     Influenza A (H1N1) NASAL, Monovalent Live 10/28/2009     PPD 6/6/2012     PPD 6/6/2013     Adacel (Tdap) 12/21/2011         Allergies:     Last Reviewed on 1/29/2018 08:59 AM by Ioana Colby      No Known Drug Allergies.         Current Medications:     Last Reviewed on 1/29/2018 09:00 AM by Ioana Colby    Ventolin HFA 90mcg/1actuation Oral Inhaler Inhale 2 puff(s) by mouth 4 times a day as needed     Levothyroxine Sodium 0.088mg Tablet     Fluticasone Furoate 27.5mcg/1spray  Nasal Spray     Ibuprofen     Sudafed         OBJECTIVE:        Vitals:         Current: 1/29/2018 8:58:41 AM    Ht:  6 ft, 0.75 in;  Wt: 177 lbs;  BMI: 23.5    T: 99.1 F (oral);  BP: 139/85 mm Hg (left arm, sitting);  P: 100 bpm (left arm (BP Cuff), sitting);  sCr: 1.14 mg/dL;  GFR: 86.38    O2 Sat: 95 % (room air)        Exams:     PHYSICAL EXAM:     GENERAL: well developed, well nourished;  no apparent distress;     EYES: PERRL, EOMI     E/N/T: EARS: external auditory canal normal;  both TMs are dull;  NOSE: normal turbinates; no sinus tenderness; OROPHARYNX: oral mucosa is normal; posterior pharynx shows no exudate;     NECK: range of motion is normal; trachea is midline;     RESPIRATORY: normal respiratory rate and pattern with no distress; normal breath sounds with no rales, rhonchi, wheezes or rubs;     CARDIOVASCULAR: normal rate; rhythm is regular;     GASTROINTESTINAL: nontender; normal bowel sounds; no organomegaly;     MUSCULOSKELETAL: normal gait;     NEUROLOGIC: mental status: alert and oriented x 3; GROSSLY INTACT     PSYCHIATRIC: appropriate affect and demeanor;         Lab/Test Results:             Influenza A and B:  Negative (01/29/2018),     Performed by::  tls (01/29/2018),             ASSESSMENT           789.09   R10.11  Abdominal pain, other specified site              DDx:         ORDERS:         Lab Orders:       [id number]  Infectious agent antigen detection by immunoassay; Influenza  (In-House)         98995-86  Infectious agent antigen detection by immunoassay; Influenza  (In-House)           Other Orders:       [id number]  HIDA W/O CCK  (Send-Out)                   PLAN:          Abdominal pain, other specified site  Discussed negative influenza testing with patient. Will order Hida Scan for persistent abdominal symptoms.         RADIOLOGY:  I have ordered Disida Scan to be done today.      RECOMMENDATIONS given include: Further recommendation to be given after test results are complete.       FOLLOW-UP: Schedule follow-up appointments on a p.r.n. basis. Chronic visit follow up           Orders:       [id number]  HIDA W/O CCK  (Send-Out)               Other Orders:       [id number]  Infectious agent antigen detection by immunoassay; Influenza  (In-House)         88723-59  Infectious agent antigen detection by immunoassay; Influenza  (In-House)           Patient Recommendations:        For  Abdominal pain, other specified site:     Schedule follow-up appointments as needed.              CHARGE CAPTURE           **Please note: ICD descriptions below are intended for billing purposes only and may not represent clinical diagnoses**        Primary Diagnosis:         789.09 Abdominal pain, other specified site            R10.11    Right upper quadrant pain              Orders:          52289   Office/outpatient visit; established patient, level 3  (In-House)               Other Orders:           29029   Infectious agent antigen detection by immunoassay; Influenza  (In-House)             71292 -59  Infectious agent antigen detection by immunoassay; Influenza  (In-House)           ADDENDUMS:      ____________________________________    Addendum: 01/30/2018 10:43 AM - Sharee Garcia         Health Summary Faxed to:        User Entered Recipient; Number [phone]            Addendum: 01/31/2018 11:20 AM - Piedad Kincaid        Add    R05 cough    AC            Addendum: 02/12/2018 01:17 PM - Queenie Clements         Visit Note Faxed to:        User Entered Recipient; Number [phone]     Health Summary Faxed to:        User Entered Recipient; Number [phone]
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
## 2021-07-01 VITALS
SYSTOLIC BLOOD PRESSURE: 125 MMHG | WEIGHT: 177.6 LBS | HEIGHT: 73 IN | TEMPERATURE: 97 F | BODY MASS INDEX: 23.54 KG/M2 | OXYGEN SATURATION: 98 % | DIASTOLIC BLOOD PRESSURE: 73 MMHG | HEART RATE: 74 BPM

## 2021-07-01 VITALS
HEIGHT: 73 IN | DIASTOLIC BLOOD PRESSURE: 81 MMHG | HEART RATE: 83 BPM | WEIGHT: 177.6 LBS | BODY MASS INDEX: 23.54 KG/M2 | TEMPERATURE: 98.1 F | SYSTOLIC BLOOD PRESSURE: 140 MMHG

## 2021-07-01 VITALS
WEIGHT: 177 LBS | OXYGEN SATURATION: 95 % | HEART RATE: 100 BPM | HEIGHT: 73 IN | BODY MASS INDEX: 23.46 KG/M2 | TEMPERATURE: 99.1 F | SYSTOLIC BLOOD PRESSURE: 139 MMHG | DIASTOLIC BLOOD PRESSURE: 85 MMHG

## 2021-07-02 VITALS
HEART RATE: 81 BPM | HEIGHT: 73 IN | SYSTOLIC BLOOD PRESSURE: 127 MMHG | DIASTOLIC BLOOD PRESSURE: 92 MMHG | WEIGHT: 171.8 LBS | BODY MASS INDEX: 22.77 KG/M2 | TEMPERATURE: 97.4 F

## 2021-07-02 VITALS
HEIGHT: 73 IN | HEART RATE: 80 BPM | DIASTOLIC BLOOD PRESSURE: 75 MMHG | SYSTOLIC BLOOD PRESSURE: 126 MMHG | WEIGHT: 169.2 LBS | BODY MASS INDEX: 22.43 KG/M2 | TEMPERATURE: 97.1 F

## 2021-07-02 VITALS
BODY MASS INDEX: 22.5 KG/M2 | HEIGHT: 73 IN | TEMPERATURE: 96.9 F | SYSTOLIC BLOOD PRESSURE: 122 MMHG | WEIGHT: 169.8 LBS | DIASTOLIC BLOOD PRESSURE: 85 MMHG | HEART RATE: 77 BPM

## 2021-10-25 RX ORDER — LEVOTHYROXINE SODIUM 0.1 MG/1
TABLET ORAL
Qty: 30 TABLET | Refills: 0 | Status: SHIPPED | OUTPATIENT
Start: 2021-10-25 | End: 2021-12-06

## 2021-12-06 RX ORDER — LEVOTHYROXINE SODIUM 0.1 MG/1
TABLET ORAL
Qty: 30 TABLET | Refills: 0 | Status: SHIPPED | OUTPATIENT
Start: 2021-12-06 | End: 2022-04-18

## 2022-01-21 ENCOUNTER — OFFICE VISIT (OUTPATIENT)
Dept: FAMILY MEDICINE CLINIC | Age: 42
End: 2022-01-21

## 2022-01-21 VITALS
HEART RATE: 93 BPM | WEIGHT: 173 LBS | SYSTOLIC BLOOD PRESSURE: 128 MMHG | TEMPERATURE: 98 F | BODY MASS INDEX: 22.93 KG/M2 | DIASTOLIC BLOOD PRESSURE: 85 MMHG | OXYGEN SATURATION: 97 % | HEIGHT: 73 IN

## 2022-01-21 DIAGNOSIS — U07.1 COVID-19 VIRUS INFECTION: Primary | ICD-10-CM

## 2022-01-21 DIAGNOSIS — J02.9 SORE THROAT: ICD-10-CM

## 2022-01-21 LAB
EXPIRATION DATE: NORMAL
EXPIRATION DATE: NORMAL
FLUAV AG NPH QL: NEGATIVE
FLUBV AG NPH QL: NEGATIVE
INTERNAL CONTROL: NORMAL
INTERNAL CONTROL: NORMAL
Lab: NORMAL
Lab: NORMAL
S PYO AG THROAT QL: NEGATIVE

## 2022-01-21 PROCEDURE — 87880 STREP A ASSAY W/OPTIC: CPT | Performed by: FAMILY MEDICINE

## 2022-01-21 PROCEDURE — 99213 OFFICE O/P EST LOW 20 MIN: CPT | Performed by: FAMILY MEDICINE

## 2022-01-21 PROCEDURE — 87804 INFLUENZA ASSAY W/OPTIC: CPT | Performed by: FAMILY MEDICINE

## 2022-01-21 PROCEDURE — U0004 COV-19 TEST NON-CDC HGH THRU: HCPCS | Performed by: FAMILY MEDICINE

## 2022-01-21 PROCEDURE — 87081 CULTURE SCREEN ONLY: CPT | Performed by: FAMILY MEDICINE

## 2022-01-21 RX ORDER — LIDOCAINE HYDROCHLORIDE 20 MG/ML
5 SOLUTION OROPHARYNGEAL 3 TIMES DAILY PRN
Qty: 100 ML | Refills: 0 | Status: SHIPPED | OUTPATIENT
Start: 2022-01-21 | End: 2022-05-23

## 2022-01-21 NOTE — PROGRESS NOTES
"Chief Complaint  Headache (took a home kit test wednesday and it was pos for covid) and Sore Throat    Subjective          Babatunde Markham presents to [facility] today for acute complaint of sore throat for the past 3 days.  He reports positive fatigue and malaise, negative fevers, positive chills, positive headaches, positive rhinorrhea, positive congestion, positive severe constant sore throat, negative changes in taste or smell.  He reports positive cough, productive of no sputum, negative chest pain, negative shortness of breath.  He has negative abdominal pain, negative nausea, negative vomiting, negative diarrhea, negative body aches.  Sick contacts:  OR/ICU device rep.  He tested positive for COVID with a home test on Wednesday.   He has had Pfizer x 2 but no booster.  He did get a flu shot.  Doesn't smoke.  No lung disease.    He has been using Mucinex OTC.  He has been alternating Tylenol (500 mg) and ibuprofen (600 mg) back and forth every 4-5 hours.        Objective   Vital Signs:   /85 (BP Location: Right arm, Patient Position: Sitting, Cuff Size: Large Adult)   Pulse 93   Temp 98 °F (36.7 °C) (Oral)   Ht 184.8 cm (72.75\")   Wt 78.5 kg (173 lb)   SpO2 97%   BMI 22.98 kg/m²     Physical Exam  Vitals reviewed.   Constitutional:       General: He is not in acute distress.     Appearance: Normal appearance.   HENT:      Right Ear: Tympanic membrane, ear canal and external ear normal.      Left Ear: Tympanic membrane, ear canal and external ear normal.      Nose: Congestion and rhinorrhea present.      Right Turbinates: Swollen and pale.      Left Turbinates: Swollen and pale.   Eyes:      Extraocular Movements: Extraocular movements intact.      Pupils: Pupils are equal, round, and reactive to light.   Cardiovascular:      Rate and Rhythm: Normal rate and regular rhythm.      Heart sounds: No murmur heard.      Pulmonary:      Effort: Pulmonary effort is normal.     "  Breath sounds: Normal breath sounds. No wheezing, rhonchi or rales.   Abdominal:      General: Bowel sounds are normal.      Tenderness: There is no abdominal tenderness.   Musculoskeletal:         General: Normal range of motion.   Neurological:      Mental Status: He is alert.             Assessment and Plan    Diagnoses and all orders for this visit:    1. COVID-19 virus infection (Primary)  Assessment & Plan:  Sx consistent with possible COVID for the past 3 days.  He has had known COVID exposures.  Rapid strep and flu negative.  No rapid COVID testing available currently.  He has had COVID testing done previously (positive home test).  We will send PCR testing for COVID.  No evidence of bacterial infection.  Viscous lidocaine sent for severe sore throat.  Continue use of Tylenol and Motrin alternating every 4 hours as well as warm salt water gargle and throat lozenges.  Symptomatic care recommended with OTC analgesics for pain/fever, OTC decongestants and cough suppressants prn.  Stay well hydrated.  Humidifier in the bedroom at night.  Hot tea with lemon and honey.  RTC prn worsening or failure to improve of sx. Work note not needed.    Orders:  -     Lidocaine Viscous HCl (XYLOCAINE) 2 % solution; Take 5 mL by mouth 3 (Three) Times a Day As Needed (sore throat).  Dispense: 100 mL; Refill: 0    2. Sore throat  -     POCT rapid strep A  -     COVID-19,APTIMA PANTHER(ARACELI),[facility], NP/OP SWAB IN UTM/VTM/SALINE TRANSPORT MEDIA,24 HR TAT - Swab, Nasopharynx; Future  -     POCT Influenza A/B  -     COVID-19,APTIMA PANTHER(ARACELI),[facility], NP/OP SWAB IN UTM/VTM/SALINE TRANSPORT MEDIA,24 HR TAT - Swab, Nasopharynx  -     Beta Strep Culture, Throat - , Throat; Future  -     Beta Strep Culture, Throat - Swab, Throat       Follow Up   Return if symptoms worsen or fail to improve.  Patient was given instructions and counseling regarding his condition or for health maintenance advice. Please see specific  information pulled into the AVS if appropriate.

## 2022-01-21 NOTE — ASSESSMENT & PLAN NOTE
Sx consistent with possible COVID for the past 3 days.  He has had known COVID exposures.  Rapid strep and flu negative.  No rapid COVID testing available currently.  He has had COVID testing done previously (positive home test).  We will send PCR testing for COVID.  No evidence of bacterial infection.  Viscous lidocaine sent for severe sore throat.  Continue use of Tylenol and Motrin alternating every 4 hours as well as warm salt water gargle and throat lozenges.  Symptomatic care recommended with OTC analgesics for pain/fever, OTC decongestants and cough suppressants prn.  Stay well hydrated.  Humidifier in the bedroom at night.  Hot tea with lemon and honey.  RTC prn worsening or failure to improve of sx. Work note not needed.

## 2022-01-23 LAB
BACTERIA SPEC AEROBE CULT: NORMAL
SARS-COV-2 RNA PNL SPEC NAA+PROBE: DETECTED

## 2022-04-18 RX ORDER — LEVOTHYROXINE SODIUM 0.1 MG/1
TABLET ORAL
Qty: 30 TABLET | Refills: 2 | Status: SHIPPED | OUTPATIENT
Start: 2022-04-18 | End: 2023-03-07 | Stop reason: SDUPTHER

## 2022-05-23 ENCOUNTER — HOSPITAL ENCOUNTER (OUTPATIENT)
Dept: GENERAL RADIOLOGY | Facility: HOSPITAL | Age: 42
Discharge: HOME | End: 2022-05-23
Admitting: NURSE PRACTITIONER

## 2022-05-23 ENCOUNTER — TELEPHONE (OUTPATIENT)
Dept: FAMILY MEDICINE CLINIC | Age: 42
End: 2022-05-23

## 2022-05-23 ENCOUNTER — OFFICE VISIT (OUTPATIENT)
Dept: FAMILY MEDICINE CLINIC | Age: 42
End: 2022-05-23

## 2022-05-23 VITALS
HEART RATE: 80 BPM | DIASTOLIC BLOOD PRESSURE: 81 MMHG | TEMPERATURE: 98 F | BODY MASS INDEX: 23.06 KG/M2 | WEIGHT: 174 LBS | SYSTOLIC BLOOD PRESSURE: 120 MMHG | HEIGHT: 73 IN

## 2022-05-23 DIAGNOSIS — M54.2 NECK PAIN: Primary | ICD-10-CM

## 2022-05-23 DIAGNOSIS — M54.2 NECK PAIN: ICD-10-CM

## 2022-05-23 PROCEDURE — 99213 OFFICE O/P EST LOW 20 MIN: CPT | Performed by: NURSE PRACTITIONER

## 2022-05-23 PROCEDURE — 72040 X-RAY EXAM NECK SPINE 2-3 VW: CPT

## 2022-05-23 RX ORDER — BACLOFEN 10 MG/1
10 TABLET ORAL 3 TIMES DAILY PRN
Qty: 30 TABLET | Refills: 0 | Status: SHIPPED | OUTPATIENT
Start: 2022-05-23 | End: 2022-07-28

## 2022-05-23 NOTE — PROGRESS NOTES
"Chief Complaint  Neck Pain (Neck pain X 3 days )    Subjective          Babatunde Markham presents to [facility]      The patient reports he went to bed on Thursday night, 05/19/2022, and woke up on Friday, 05/20/2022, thinking he slept on his neck wrong, so he went to play golf. However, throughout the day of 05/20/2022, the pain progressively worsened and he was unable to turn his head. The patient states finally, last night he was able to slightly turn his head. He is unaware of any cause or injury. He complains he is unable to slay down or even sleep. The patient reports tenderness on the bone and vertebrae. He states he has been using ibuprofen, and his mother brought him Tylenol 3 on 05/21/2022, which helped him sleep. The patient notes the Tylenol helps relieve the pain for about 3-4 hours. He complains he was woken up from his sleep last night due to severe pain. The patient has been using a massage gun, however, he states he can not get anywhere near the area due to intense pain. He has taken muscle relaxers in the past.      Objective   Vital Signs:   /81 (BP Location: Right arm, Patient Position: Sitting)   Pulse 80   Temp 98 °F (36.7 °C) (Oral)   Ht 184.8 cm (72.75\")   Wt 78.9 kg (174 lb)   BMI 23.11 kg/m²       Physical Exam  Vitals reviewed.   Constitutional:       General: He is not in acute distress.     Appearance: Normal appearance. He is well-developed.   HENT:      Head: Normocephalic and atraumatic.   Eyes:      Conjunctiva/sclera: Conjunctivae normal.      Pupils: Pupils are equal, round, and reactive to light.   Cardiovascular:      Rate and Rhythm: Normal rate and regular rhythm.      Heart sounds: Normal heart sounds. No murmur heard.  Pulmonary:      Effort: Pulmonary effort is normal. No respiratory distress.      Breath sounds: Normal breath sounds.   Musculoskeletal:      Comments: Tenderness noted to cervical spine and left trapezius.  Decreased " ROM with extension, flexion and side to side.    Skin:     General: Skin is warm and dry.   Neurological:      Mental Status: He is alert and oriented to person, place, and time.   Psychiatric:         Mood and Affect: Mood and affect normal.         Behavior: Behavior normal.         Thought Content: Thought content normal.         Judgment: Judgment normal.              Result Review :                Assessment and Plan    Diagnoses and all orders for this visit:    1. Neck pain (Primary)  Comments:  Will notify pt of results and treat accordingly. Baclofen as needed. Potential s/e of med discussed. Tylenol/NSAIDs prn. Alternate heat and ice.   Orders:  -     XR Spine Cervical 2 or 3 View; Future    Other orders  -     baclofen (LIORESAL) 10 MG tablet; Take 1 tablet by mouth 3 (Three) Times a Day As Needed for Muscle Spasms.  Dispense: 30 tablet; Refill: 0        Follow Up    Return if symptoms worsen or fail to improve.  Patient was given instructions and counseling regarding his condition or for health maintenance advice. Please see specific information pulled into the AVS if appropriate.     Patient to notify office with any acute concerns or issues. Patient verbalizes understanding, agrees with plan of care and has no further questions upon discharge.  Please note that portions of this note were completed with a voice recognition program.    SALOMÓN Correa   Transcribed from ambient dictation for SALOMÓN Correa by Samreen Lama.  05/23/22   10:59 EDT    Patient verbalized consent to the visit recording.

## 2022-05-23 NOTE — TELEPHONE ENCOUNTER
Caller: Babatunde Markham    Relationship: Self    Best call back number: [phone]     Caller requesting test results: YES     What test was performed: X-RAYS ON NECK     When was the test performed: 05/23    Where was the test performed: LAB    Additional notes: PLEASE ADVISE PATIENT WHEN RESULTS ARE IN

## 2022-05-26 ENCOUNTER — TELEPHONE (OUTPATIENT)
Dept: FAMILY MEDICINE CLINIC | Age: 42
End: 2022-05-26

## 2022-05-26 DIAGNOSIS — M54.12 CERVICAL RADICULOPATHY: ICD-10-CM

## 2022-05-26 DIAGNOSIS — M54.2 NECK PAIN: Primary | ICD-10-CM

## 2022-05-26 NOTE — TELEPHONE ENCOUNTER
Pt called stating the neck pain hasn't gotten much better and is now noticing some tingling and numbness in his back that comes and goes, wondering if he should have an MRI to evaluate.

## 2022-05-26 NOTE — TELEPHONE ENCOUNTER
Please let pt know that we can try, but insurance may not cover. We may have to do physical therapy first. june Traore

## 2022-05-26 NOTE — TELEPHONE ENCOUNTER
Caller: Babatunde Markham    Relationship: Self    Best call back number: [phone]    What is the best time to reach you: ANY     Who are you requesting to speak with CLINICAL     What was the call regarding: PATIENT WOULD LIKE A CALL BACK REGARDING NECK PAIN     Do you require a callback: YES

## 2022-06-14 ENCOUNTER — APPOINTMENT (OUTPATIENT)
Dept: MRI IMAGING | Facility: HOSPITAL | Age: 42
End: 2022-06-14

## 2022-07-12 ENCOUNTER — TELEPHONE (OUTPATIENT)
Dept: FAMILY MEDICINE CLINIC | Age: 42
End: 2022-07-12

## 2022-07-12 DIAGNOSIS — M54.12 CERVICAL RADICULOPATHY: ICD-10-CM

## 2022-07-12 DIAGNOSIS — M54.2 NECK PAIN: Primary | ICD-10-CM

## 2022-07-13 ENCOUNTER — TELEPHONE (OUTPATIENT)
Dept: FAMILY MEDICINE CLINIC | Age: 42
End: 2022-07-13

## 2022-07-13 NOTE — TELEPHONE ENCOUNTER
Scheduling needs you to sign the MRI order or they will have to reschedule his appt.  The original order was canceled by the pt then he called back wanting to go ahead and do it so I put the order back in as a verbal with read back and they state they can not take that unless you sign it as well.

## 2022-07-15 ENCOUNTER — HOSPITAL ENCOUNTER (OUTPATIENT)
Dept: MRI IMAGING | Facility: HOSPITAL | Age: 42
End: 2022-07-15

## 2022-07-15 ENCOUNTER — HOSPITAL ENCOUNTER (OUTPATIENT)
Dept: MRI IMAGING | Facility: HOSPITAL | Age: 42
Discharge: HOME | End: 2022-07-15
Admitting: NURSE PRACTITIONER

## 2022-07-15 DIAGNOSIS — M54.2 NECK PAIN: ICD-10-CM

## 2022-07-15 DIAGNOSIS — M54.12 CERVICAL RADICULOPATHY: ICD-10-CM

## 2022-07-15 PROCEDURE — 72141 MRI NECK SPINE W/O DYE: CPT

## 2022-07-21 ENCOUNTER — TELEPHONE (OUTPATIENT)
Dept: FAMILY MEDICINE CLINIC | Age: 42
End: 2022-07-21

## 2022-07-21 DIAGNOSIS — M54.2 NECK PAIN: Primary | ICD-10-CM

## 2022-07-21 NOTE — TELEPHONE ENCOUNTER
See MRI report.  Pt would like to go ahead with the PT referral.  He would like to go to [facility].  Please sign orders.

## 2022-07-21 NOTE — TELEPHONE ENCOUNTER
Caller: Babatunde Markham    Relationship: Self    Best call back number: [phone]    Caller requesting test results:PATIENT    What test was performed: MRI    When was the test performed: 07/15/2022    Where was the test performed: IN OFFICE    Additional notes: PATIENT WOULD LIKE TO DISCUSS RESULTS OF MRI. EMILY SENT MESSAGE AND HE IS CALLING BACK TO DISCUSS. ALSO, HE NEEDS REFERRALS AS FOLLOW UP TO THE MRI. PLEASE CALL PATIENT BACK AND ADVISE.

## 2022-07-25 ENCOUNTER — TELEPHONE (OUTPATIENT)
Dept: FAMILY MEDICINE CLINIC | Age: 42
End: 2022-07-25

## 2022-07-25 NOTE — TELEPHONE ENCOUNTER
Neuro office called pt to set up an appt with him and he refused.  States he already has a Neuro surgeon and was just waiting for us to schedule him with PT.  Referral canceled.

## 2022-07-27 ENCOUNTER — TELEPHONE (OUTPATIENT)
Dept: FAMILY MEDICINE CLINIC | Age: 42
End: 2022-07-27

## 2022-07-27 NOTE — TELEPHONE ENCOUNTER
Pt states the baclofen isn't helping at all, has started PT and has seen the neurosurgeon, wondering if there is something different he can take to help.

## 2022-07-28 RX ORDER — CYCLOBENZAPRINE HCL 10 MG
10 TABLET ORAL 3 TIMES DAILY PRN
Qty: 30 TABLET | Refills: 0 | Status: SHIPPED | OUTPATIENT
Start: 2022-07-28 | End: 2022-08-07

## 2022-07-28 NOTE — TELEPHONE ENCOUNTER
Please let him know that I have sent in flexeril and to not take it along with the baclofen. june Traore

## 2022-08-04 ENCOUNTER — APPOINTMENT (OUTPATIENT)
Dept: MRI IMAGING | Facility: HOSPITAL | Age: 42
End: 2022-08-04

## 2022-12-01 ENCOUNTER — OFFICE VISIT (OUTPATIENT)
Dept: FAMILY MEDICINE CLINIC | Age: 42
End: 2022-12-01

## 2022-12-01 VITALS
OXYGEN SATURATION: 100 % | SYSTOLIC BLOOD PRESSURE: 133 MMHG | DIASTOLIC BLOOD PRESSURE: 78 MMHG | HEART RATE: 68 BPM | BODY MASS INDEX: 23.01 KG/M2 | TEMPERATURE: 97.7 F | HEIGHT: 73 IN | WEIGHT: 173.6 LBS

## 2022-12-01 DIAGNOSIS — R11.0 NAUSEA: Primary | ICD-10-CM

## 2022-12-01 LAB
EXPIRATION DATE: NORMAL
FLUAV AG UPPER RESP QL IA.RAPID: NOT DETECTED
FLUBV AG UPPER RESP QL IA.RAPID: NOT DETECTED
INTERNAL CONTROL: NORMAL
Lab: NORMAL
SARS-COV-2 AG UPPER RESP QL IA.RAPID: NOT DETECTED

## 2022-12-01 PROCEDURE — 99212 OFFICE O/P EST SF 10 MIN: CPT | Performed by: NURSE PRACTITIONER

## 2022-12-01 PROCEDURE — 87428 SARSCOV & INF VIR A&B AG IA: CPT | Performed by: NURSE PRACTITIONER

## 2022-12-01 NOTE — PROGRESS NOTES
"Chief Complaint  Abdominal Pain (Patient was sick last week with fever, chills, body aches. Patient is still having a lot of nausea. )    Subjective        Babatunde Markham presents to [facility]  Abdominal Pain  This is a recurrent problem. The current episode started 1 to 4 weeks ago. The problem occurs intermittently. The pain is located in the suprapubic region. The quality of the pain is sharp and dull. The abdominal pain does not radiate. Associated symptoms include constipation and nausea. The pain is aggravated by eating. The pain is relieved by bowel movements.       Objective   Vital Signs:  /78 (BP Location: Left arm, Patient Position: Sitting, Cuff Size: Adult)   Pulse 68   Temp 97.7 °F (36.5 °C) (Oral)   Ht 184.8 cm (72.75\")   Wt 78.7 kg (173 lb 9.6 oz)   SpO2 100%   BMI 23.06 kg/m²   Estimated body mass index is 23.06 kg/m² as calculated from the following:    Height as of this encounter: 184.8 cm (72.75\").    Weight as of this encounter: 78.7 kg (173 lb 9.6 oz).    BMI is within normal parameters. No other follow-up for BMI required.      Physical Exam  HENT:      Head: Normocephalic.   Cardiovascular:      Rate and Rhythm: Normal rate and regular rhythm.   Pulmonary:      Effort: Pulmonary effort is normal. No respiratory distress.      Breath sounds: Normal breath sounds. No stridor. No wheezing, rhonchi or rales.   Abdominal:      General: Bowel sounds are normal.      Palpations: Abdomen is soft.   Skin:     General: Skin is warm and dry.   Neurological:      Mental Status: He is alert and oriented to person, place, and time.   Psychiatric:         Mood and Affect: Mood normal.        Result Review :                 Results for orders placed or performed in visit on 12/01/22   POCT SARS-CoV-2 Antigen MICHAEL + Flu    Specimen: Swab   Result Value Ref Range    SARS Antigen Not Detected Not Detected, Presumptive Negative    Influenza A Antigen MICHAEL Not Detected " Not Detected    Influenza B Antigen MICHAEL Not Detected Not Detected    Internal Control Passed Passed    Lot Number [id number]     Expiration Date 9/6/23        Assessment and Plan   Diagnoses and all orders for this visit:    1. Nausea (Primary)  Comments:  Improving, likely viral and mild dehydration. Force fluids, OTC stool softner as needed, follow up with PCP in two weeks if pain continues  Orders:  -     POCT SARS-CoV-2 Antigen MICHAEL + Flu             Follow Up   No follow-ups on file.  Patient was given instructions and counseling regarding his condition or for health maintenance advice. Please see specific information pulled into the AVS if appropriate.

## 2023-01-05 ENCOUNTER — OFFICE VISIT (OUTPATIENT)
Dept: FAMILY MEDICINE CLINIC | Age: 43
End: 2023-01-05
Payer: COMMERCIAL

## 2023-01-05 VITALS
DIASTOLIC BLOOD PRESSURE: 86 MMHG | HEART RATE: 75 BPM | TEMPERATURE: 97.9 F | BODY MASS INDEX: 23.38 KG/M2 | HEIGHT: 73 IN | SYSTOLIC BLOOD PRESSURE: 128 MMHG | WEIGHT: 176.4 LBS

## 2023-01-05 DIAGNOSIS — E04.1 THYROID NODULE: ICD-10-CM

## 2023-01-05 DIAGNOSIS — E03.9 ACQUIRED HYPOTHYROIDISM: ICD-10-CM

## 2023-01-05 DIAGNOSIS — Z00.00 ROUTINE GENERAL MEDICAL EXAMINATION AT A HEALTH CARE FACILITY: Primary | ICD-10-CM

## 2023-01-05 PROCEDURE — 99396 PREV VISIT EST AGE 40-64: CPT | Performed by: NURSE PRACTITIONER

## 2023-01-05 NOTE — PROGRESS NOTES
Chief Complaint  Annual Exam    Subjective          Babatunde Markham presents to [facility]  For annual exam.  Patient is due for routine blood work.  He goes to eye doctor and dentist routinely.  He is going to have biometric screening for his insurance company.  Will give patient external lab orders so that he can get them done at the same time.  Patient does have history of thyroid nodule that he was following an endocrinologist for.  Patient states he has not had a ultrasound in 2 years.  He has not been taking his levothyroxine as prescribed in recent months either.  Patient states he is ready to get his health back on track.  Review of Systems   Constitutional: Negative for fatigue.   HENT: Negative for hearing loss and trouble swallowing.    Eyes: Negative for blurred vision.   Respiratory: Negative for cough and shortness of breath.    Cardiovascular: Negative for chest pain, palpitations and leg swelling.   Gastrointestinal: Negative for abdominal pain, constipation, diarrhea, nausea and vomiting.   Genitourinary: Negative for dysuria, frequency and urgency.   Musculoskeletal: Negative for myalgias.   Skin: Negative for color change and rash.   Neurological: Negative for dizziness, weakness and headache.   Psychiatric/Behavioral: Negative for sleep disturbance, suicidal ideas and depressed mood. The patient is not nervous/anxious.          Objective   Vital Signs:   Vitals:    01/05/23 1407   BP: 128/86   BP Location: Right arm   Patient Position: Sitting   Cuff Size: Adult   Pulse: 75   Temp: 97.9 °F (36.6 °C)   TempSrc: Oral   Weight: 80 kg (176 lb 6.4 oz)   Height: 184.8 cm (72.76\")       Physical Exam  Vitals reviewed.   Constitutional:       General: He is not in acute distress.     Appearance: Normal appearance. He is not diaphoretic.   HENT:      Head: Normocephalic and atraumatic. Hair is normal.      Right Ear: Hearing and external ear normal.      Left Ear: Hearing  and external ear normal.      Nose: Nose normal. No nasal deformity.      Mouth/Throat:      Mouth: Mucous membranes are moist. No oral lesions.      Pharynx: Uvula midline. No uvula swelling.   Eyes:      General: Lids are normal. No scleral icterus.        Right eye: No discharge.         Left eye: No discharge.      Extraocular Movements: Extraocular movements intact.      Right eye: Normal extraocular motion and no nystagmus.      Left eye: Normal extraocular motion and no nystagmus.      Conjunctiva/sclera: Conjunctivae normal.      Pupils: Pupils are equal, round, and reactive to light.   Neck:      Thyroid: No thyromegaly.   Cardiovascular:      Rate and Rhythm: Normal rate and regular rhythm.      Pulses: Normal pulses.      Heart sounds: Normal heart sounds. No murmur heard.    No gallop.   Pulmonary:      Effort: Pulmonary effort is normal. No respiratory distress.      Breath sounds: Normal breath sounds. No wheezing or rales.   Chest:      Chest wall: No tenderness.   Abdominal:      General: Bowel sounds are normal. There is no distension.      Palpations: Abdomen is soft. There is no mass.      Tenderness: There is no abdominal tenderness. There is no guarding.      Hernia: No hernia is present.   Musculoskeletal:         General: No tenderness or deformity. Normal range of motion.      Cervical back: Normal range of motion and neck supple.   Lymphadenopathy:      Cervical: No cervical adenopathy.   Skin:     General: Skin is warm and dry.      Findings: No rash.   Neurological:      Mental Status: He is alert and oriented to person, place, and time.      Coordination: Coordination normal.   Psychiatric:         Mood and Affect: Mood normal.         Behavior: Behavior normal.         Thought Content: Thought content normal.         Judgment: Judgment normal.          Result Review :              Assessment and Plan    Diagnoses and all orders for this visit:    1. Routine general medical examination at  a health care facility (Primary)  -     Cancel: Comprehensive Metabolic Panel; Future  -     Cancel: Lipid Panel; Future  -     Cancel: Hepatitis C Antibody; Future  -     Comprehensive Metabolic Panel; Future  -     Hepatitis C Antibody; Future    2. Acquired hypothyroidism  -     Cancel: TSH Rfx On Abnormal To Free T4; Future  -     TSH Rfx On Abnormal To Free T4; Future    3. Thyroid nodule  -     US Thyroid    Counseled patient on routine exercise and being compliant with thyroid treatment.  Patient to get blood work drawn per his insurance biometric screening and my orders.  Will notify patient of results and treat accordingly.  Ultrasound of thyroid also ordered.    Patient to notify office with any acute concerns or issues.  Patient verbalizes understanding, agrees with plan of care and has no further questions upon discharge.    Please note that portions of this note were completed with a voice recognition program.    Follow Up    Return in about 6 months (around 7/5/2023) for Recheck.  Patient was given instructions and counseling regarding his condition or for health maintenance advice. Please see specific information pulled into the AVS if appropriate.

## 2023-01-11 ENCOUNTER — HOSPITAL ENCOUNTER (OUTPATIENT)
Dept: ULTRASOUND IMAGING | Facility: HOSPITAL | Age: 43
Discharge: HOME | End: 2023-01-11
Payer: COMMERCIAL

## 2023-01-12 DIAGNOSIS — E03.9 ACQUIRED HYPOTHYROIDISM: Primary | ICD-10-CM

## 2023-01-16 ENCOUNTER — HOSPITAL ENCOUNTER (OUTPATIENT)
Dept: ULTRASOUND IMAGING | Facility: HOSPITAL | Age: 43
Discharge: HOME | End: 2023-01-16
Admitting: NURSE PRACTITIONER
Payer: COMMERCIAL

## 2023-01-16 PROCEDURE — 76536 US EXAM OF HEAD AND NECK: CPT

## 2023-02-06 RX ORDER — LEVOTHYROXINE SODIUM 0.1 MG/1
TABLET ORAL
Qty: 30 TABLET | Refills: 2 | OUTPATIENT
Start: 2023-02-06

## 2023-02-06 NOTE — TELEPHONE ENCOUNTER
Thyroid Hormones Protocol Failed 02/06/2023 02:14 PM   Protocol Details  Normal TSH in past 12 months    Recent or future visit with authorizing provider

## 2023-03-01 ENCOUNTER — TELEPHONE (OUTPATIENT)
Dept: FAMILY MEDICINE CLINIC | Age: 43
End: 2023-03-01
Payer: COMMERCIAL

## 2023-03-01 DIAGNOSIS — E03.9 ACQUIRED HYPOTHYROIDISM: Primary | ICD-10-CM

## 2023-03-01 NOTE — TELEPHONE ENCOUNTER
Spoke with pt regarding overdue 1/12/23 lab order. Pt states he would like Yuki to start taking over filling his thyroid medication, instead of his previous physician in [place]. He has been out of his thyroid medication for about a week now and would like a refill so that he can start back on the medication before he repeats his TSH labs.

## 2023-03-07 RX ORDER — LEVOTHYROXINE SODIUM 0.1 MG/1
100 TABLET ORAL DAILY
Qty: 90 TABLET | Refills: 1 | Status: SHIPPED | OUTPATIENT
Start: 2023-03-07

## 2023-04-24 ENCOUNTER — TELEPHONE (OUTPATIENT)
Dept: FAMILY MEDICINE CLINIC | Age: 43
End: 2023-04-24
Payer: COMMERCIAL

## 2023-12-01 ENCOUNTER — OFFICE VISIT (OUTPATIENT)
Dept: FAMILY MEDICINE CLINIC | Age: 43
End: 2023-12-01
Payer: COMMERCIAL

## 2023-12-01 VITALS
BODY MASS INDEX: 1.06 KG/M2 | HEIGHT: 73 IN | SYSTOLIC BLOOD PRESSURE: 122 MMHG | OXYGEN SATURATION: 98 % | HEART RATE: 87 BPM | WEIGHT: 8 LBS | DIASTOLIC BLOOD PRESSURE: 85 MMHG

## 2023-12-01 DIAGNOSIS — M54.12 CERVICAL RADICULOPATHY: Primary | ICD-10-CM

## 2023-12-01 DIAGNOSIS — Z23 NEED FOR VACCINATION: ICD-10-CM

## 2023-12-01 DIAGNOSIS — M79.10 MUSCLE TENSION PAIN: ICD-10-CM

## 2023-12-01 RX ORDER — CYCLOBENZAPRINE HCL 10 MG
10 TABLET ORAL 3 TIMES DAILY PRN
Qty: 30 TABLET | Refills: 1 | Status: SHIPPED | OUTPATIENT
Start: 2023-12-01

## 2023-12-01 NOTE — PROGRESS NOTES
"Chief Complaint  Back Pain (Sx started a year ago ) and Neck Pain    Subjective    Patient is in today with complaints of reoccurring neck pain, that has worsened over the last few months.  He reports that he came in in May 2022 and had an x-ray and later an MRI after completing physical therapy.  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  He has tried physical therapy, home stretches, ice, and a muscle relaxer which provided minimal relief.        Babatunde Markham presents to [facility]  Neck Pain   This is a recurrent problem. The current episode started 1 to 4 weeks ago. The problem has been gradually worsening. The pain is associated with nothing. The pain is present in the anterior neck and midline. The quality of the pain is described as stabbing and shooting (Popping and moving). The symptoms are aggravated by sneezing and bending. Associated symptoms include tingling. Pertinent negatives include no paresis. He has tried ice, home exercises and NSAIDs for the symptoms. The treatment provided mild relief.       Objective   Vital Signs:  /85 (BP Location: Right arm, Patient Position: Sitting, Cuff Size: Adult)   Pulse 87   Ht 184.8 cm (72.76\")   Wt 3.629 kg (8 lb)   SpO2 98% Comment: room air  BMI 1.06 kg/m²   Estimated body mass index is 1.06 kg/m² as calculated from the following:    Height as of this encounter: 184.8 cm (72.76\").    Weight as of this encounter: 3.629 kg (8 lb).             Physical Exam  Cardiovascular:      Rate and Rhythm: Normal rate.   Pulmonary:      Effort: Pulmonary effort is normal.   Musculoskeletal:      Cervical back: Spasms and tenderness present. No signs of trauma. Pain with movement, spinous process tenderness and muscular tenderness present. Decreased range of motion.   Skin:     General: Skin is warm and " dry.   Neurological:      Mental Status: He is alert and oriented to person, place, and time.   Psychiatric:         Mood and Affect: Mood normal.        Result Review :          PROCEDURE:  MRI CERVICAL SPINE WO CONTRAST     COMPARISON: None     INDICATIONS:  ACUTE NECK PAIN AND DECREASED ROM,WITH RADICULOPATHY ACROSS SHOULDERS AND DOWN SPINE X   1 MONTH     TECHNIQUE:    A variety of imaging planes and parameters were utilized for visualization of suspected   pathology.       FINDINGS:          CRANIOCERVICAL AREA:         Normal foramen magnum with no Chiari malformation.    PARASPINAL AREA:     Normal with no visible mass.    BONES:             No fracture, pars defect, or osseous lesion.    CORD:  Normal caliber, contour, and signal intensity.       CERVICAL DISC LEVELS:  C2-C3:  No significant disc/facet abnormality, spinal stenosis, or foraminal stenosis.    C3-C4:  No significant disc/facet abnormality, spinal stenosis, or foraminal stenosis.    C4-C5:  No significant disc/facet abnormality, spinal stenosis, or foraminal stenosis.    C5-C6:  There is mild broad-based disc bulging with a slight left paracentral component resulting in   effacement of the ventral thecal sac.  There is no spinal canal or neural foraminal stenosis.  C6-C7:  No significant disc/facet abnormality, spinal stenosis, or foraminal stenosis.    C7-T1:  No significant disc/facet abnormality, spinal stenosis, or foraminal stenosis.       IMPRESSION:               Mild C5-6 degenerative disease.  No spinal canal or neural foraminal narrowing.  No   free disc fragments or significant focal disc protrusions.            ALEXA GIRON MD         Electronically Signed and Approved By: ALEXA GIRON MD on 7/15/2022 at 16:36           Assessment and Plan   Diagnoses and all orders for this visit:    1. Cervical radiculopathy (Primary)  Comments:  Patient has completed PT, NSAIDs not benefitial, continue ice and heat, will get updated MRI due to  wosening to detemine next best steps for treatment  Orders:  -     MRI Cervical Spine Without Contrast; Future  -     cyclobenzaprine (FLEXERIL) 10 MG tablet; Take 1 tablet by mouth 3 (Three) Times a Day As Needed for Muscle Spasms.  Dispense: 30 tablet; Refill: 1    2. Muscle tension pain  -     cyclobenzaprine (FLEXERIL) 10 MG tablet; Take 1 tablet by mouth 3 (Three) Times a Day As Needed for Muscle Spasms.  Dispense: 30 tablet; Refill: 1    3. Need for vaccination  -     Fluzone >6 Months ([id number])             Follow Up   Return if symptoms worsen or fail to improve.  Patient was given instructions and counseling regarding his condition or for health maintenance advice. Please see specific information pulled into the AVS if appropriate.

## 2023-12-11 ENCOUNTER — TELEPHONE (OUTPATIENT)
Dept: FAMILY MEDICINE CLINIC | Age: 43
End: 2023-12-11
Payer: COMMERCIAL

## 2023-12-11 DIAGNOSIS — M54.12 CERVICAL RADICULOPATHY: Primary | ICD-10-CM

## 2023-12-11 DIAGNOSIS — M54.2 NECK PAIN: ICD-10-CM

## 2023-12-11 RX ORDER — HYDROCODONE BITARTRATE AND ACETAMINOPHEN 5; 325 MG/1; MG/1
1 TABLET ORAL EVERY 4 HOURS PRN
Qty: 18 TABLET | Refills: 0 | Status: SHIPPED | OUTPATIENT
Start: 2023-12-11

## 2023-12-11 NOTE — TELEPHONE ENCOUNTER
Pt called stating he is in a lot of pain with his neck and back.  They are a lot worse.  His MRI is scheduled next Monday.  He is asking what can be done in the meantime to help him?

## 2023-12-11 NOTE — TELEPHONE ENCOUNTER
Spoke with patient. Pain medication sent in and urgent referral to spine specialist placed. ER if no improvement.

## 2023-12-18 ENCOUNTER — HOSPITAL ENCOUNTER (OUTPATIENT)
Dept: MRI IMAGING | Facility: HOSPITAL | Age: 43
Discharge: HOME | End: 2023-12-18
Admitting: NURSE PRACTITIONER
Payer: COMMERCIAL

## 2023-12-18 DIAGNOSIS — M54.12 CERVICAL RADICULOPATHY: ICD-10-CM

## 2023-12-18 PROCEDURE — 72141 MRI NECK SPINE W/O DYE: CPT

## 2023-12-22 ENCOUNTER — LAB (OUTPATIENT)
Dept: LAB | Facility: HOSPITAL | Age: 43
End: 2023-12-22
Payer: COMMERCIAL

## 2023-12-22 ENCOUNTER — OFFICE VISIT (OUTPATIENT)
Dept: FAMILY MEDICINE CLINIC | Age: 43
End: 2023-12-22
Payer: COMMERCIAL

## 2023-12-22 VITALS
HEIGHT: 73 IN | HEART RATE: 76 BPM | BODY MASS INDEX: 23.33 KG/M2 | TEMPERATURE: 98 F | SYSTOLIC BLOOD PRESSURE: 132 MMHG | DIASTOLIC BLOOD PRESSURE: 89 MMHG | WEIGHT: 176 LBS

## 2023-12-22 DIAGNOSIS — M54.2 NECK PAIN: ICD-10-CM

## 2023-12-22 DIAGNOSIS — E03.9 ACQUIRED HYPOTHYROIDISM: Primary | ICD-10-CM

## 2023-12-22 DIAGNOSIS — E78.2 MIXED HYPERLIPIDEMIA: ICD-10-CM

## 2023-12-22 DIAGNOSIS — E03.9 ACQUIRED HYPOTHYROIDISM: ICD-10-CM

## 2023-12-22 DIAGNOSIS — Z00.00 ROUTINE GENERAL MEDICAL EXAMINATION AT A HEALTH CARE FACILITY: ICD-10-CM

## 2023-12-22 PROBLEM — E06.0 ACUTE THYROIDITIS: Status: RESOLVED | Noted: 2017-10-27 | Resolved: 2023-12-22

## 2023-12-22 LAB
ALBUMIN SERPL-MCNC: 4.6 G/DL (ref 3.5–5.2)
ALBUMIN/GLOB SERPL: 1.8 G/DL
ALP SERPL-CCNC: 55 U/L (ref 39–117)
ALT SERPL W P-5'-P-CCNC: 20 U/L (ref 1–41)
ANION GAP SERPL CALCULATED.3IONS-SCNC: 11 MMOL/L (ref 5–15)
AST SERPL-CCNC: 17 U/L (ref 1–40)
BILIRUB SERPL-MCNC: 0.4 MG/DL (ref 0–1.2)
BUN SERPL-MCNC: 12 MG/DL (ref 6–20)
BUN/CREAT SERPL: 9.2 (ref 7–25)
CALCIUM SPEC-SCNC: 9.9 MG/DL (ref 8.6–10.5)
CHLORIDE SERPL-SCNC: 103 MMOL/L (ref 98–107)
CHOLEST SERPL-MCNC: 186 MG/DL (ref 0–200)
CK SERPL-CCNC: 53 U/L (ref 20–200)
CO2 SERPL-SCNC: 28 MMOL/L (ref 22–29)
CREAT SERPL-MCNC: 1.3 MG/DL (ref 0.76–1.27)
EGFRCR SERPLBLD CKD-EPI 2021: 69.5 ML/MIN/1.73
GLOBULIN UR ELPH-MCNC: 2.6 GM/DL
GLUCOSE SERPL-MCNC: 72 MG/DL (ref 65–99)
HCV AB SER DONR QL: NORMAL
HDLC SERPL-MCNC: 45 MG/DL (ref 40–60)
LDLC SERPL CALC-MCNC: 125 MG/DL (ref 0–100)
LDLC/HDLC SERPL: 2.76 {RATIO}
POTASSIUM SERPL-SCNC: 4.6 MMOL/L (ref 3.5–5.2)
PROT SERPL-MCNC: 7.2 G/DL (ref 6–8.5)
SODIUM SERPL-SCNC: 142 MMOL/L (ref 136–145)
T4 FREE SERPL-MCNC: 1.29 NG/DL (ref 0.93–1.7)
TRIGL SERPL-MCNC: 85 MG/DL (ref 0–150)
TSH SERPL DL<=0.05 MIU/L-ACNC: 7.41 UIU/ML (ref 0.27–4.2)
VLDLC SERPL-MCNC: 16 MG/DL (ref 5–40)

## 2023-12-22 PROCEDURE — 80061 LIPID PANEL: CPT

## 2023-12-22 PROCEDURE — 84439 ASSAY OF FREE THYROXINE: CPT

## 2023-12-22 PROCEDURE — 86803 HEPATITIS C AB TEST: CPT

## 2023-12-22 PROCEDURE — 80053 COMPREHEN METABOLIC PANEL: CPT

## 2023-12-22 PROCEDURE — 36415 COLL VENOUS BLD VENIPUNCTURE: CPT

## 2023-12-22 PROCEDURE — 84443 ASSAY THYROID STIM HORMONE: CPT

## 2023-12-22 PROCEDURE — 99214 OFFICE O/P EST MOD 30 MIN: CPT | Performed by: NURSE PRACTITIONER

## 2023-12-22 PROCEDURE — 82550 ASSAY OF CK (CPK): CPT

## 2023-12-22 RX ORDER — GABAPENTIN 300 MG/1
300 CAPSULE ORAL 3 TIMES DAILY
COMMUNITY
Start: 2023-12-13

## 2023-12-22 RX ORDER — MELOXICAM 15 MG/1
15 TABLET ORAL DAILY
COMMUNITY
Start: 2023-12-19

## 2023-12-22 NOTE — PROGRESS NOTES
"Chief Complaint  Requesting labs/neck pain    Subjective          Babatunde Markham presents to [facility] requesting labs for possible myopathy noted on cspine MRI. This was ordered by acute care provider in the office. Pt discussed result with neuro sx and was told that it was not too concerning and gave him symptoms to monitor. Pt was initially rx steroid and gabapentin.  He was recently rx Mobic 15mg daily and states that he can already tell an improvement in pain. Pt is taking Gabapentin now very sparingly. He will be starting PT soon. Pt is also due for annual labs. He is currently taking levothyroxine 100mcg daily for hypothyroidism.     Review of Systems   Constitutional:  Negative for fatigue.   HENT:  Negative for hearing loss and trouble swallowing.    Eyes:  Negative for blurred vision.   Respiratory:  Negative for cough and shortness of breath.    Cardiovascular:  Negative for chest pain, palpitations and leg swelling.   Gastrointestinal:  Negative for abdominal pain, constipation, diarrhea, nausea and vomiting.   Genitourinary:  Negative for dysuria, frequency and urgency.   Musculoskeletal:  Positive for back pain, myalgias and neck pain.   Skin:  Negative for color change and rash.   Neurological:  Negative for dizziness, weakness and headache.   Psychiatric/Behavioral:  Negative for sleep disturbance, suicidal ideas and depressed mood. The patient is not nervous/anxious.          Objective   Vital Signs:   Vitals:    12/22/23 0751   BP: 132/89   BP Location: Right arm   Patient Position: Sitting   Pulse: 76   Temp: 98 °F (36.7 °C)   TempSrc: Oral   Weight: 79.8 kg (176 lb)   Height: 184.8 cm (72.76\")       Physical Exam  Vitals reviewed.   Constitutional:       General: He is not in acute distress.     Appearance: Normal appearance. He is not diaphoretic.   HENT:      Head: Normocephalic and atraumatic. Hair is normal.      Right Ear: Hearing and external ear normal.     "  Left Ear: Hearing and external ear normal.      Nose: Nose normal. No nasal deformity.      Mouth/Throat:      Mouth: Mucous membranes are moist. No oral lesions.      Pharynx: Uvula midline. No uvula swelling.   Eyes:      General: Lids are normal. No scleral icterus.        Right eye: No discharge.         Left eye: No discharge.      Extraocular Movements: Extraocular movements intact.      Right eye: Normal extraocular motion and no nystagmus.      Left eye: Normal extraocular motion and no nystagmus.      Conjunctiva/sclera: Conjunctivae normal.      Pupils: Pupils are equal, round, and reactive to light.   Neck:      Thyroid: No thyromegaly.   Cardiovascular:      Rate and Rhythm: Normal rate and regular rhythm.      Pulses: Normal pulses.      Heart sounds: Normal heart sounds. No murmur heard.     No gallop.   Pulmonary:      Effort: Pulmonary effort is normal. No respiratory distress.      Breath sounds: Normal breath sounds. No wheezing or rales.   Chest:      Chest wall: No tenderness.   Abdominal:      General: Bowel sounds are normal. There is no distension.      Palpations: Abdomen is soft. There is no mass.      Tenderness: There is no abdominal tenderness. There is no guarding.      Hernia: No hernia is present.   Musculoskeletal:         General: No swelling, deformity or signs of injury. Normal range of motion.      Cervical back: Normal range of motion and neck supple.   Lymphadenopathy:      Cervical: No cervical adenopathy.   Skin:     General: Skin is warm and dry.      Findings: No rash.   Neurological:      Mental Status: He is alert and oriented to person, place, and time.      Coordination: Coordination normal.   Psychiatric:         Mood and Affect: Mood normal.         Behavior: Behavior normal.         Thought Content: Thought content normal.         Judgment: Judgment normal.          Result Review :              Assessment and Plan    Diagnoses and all orders for this visit:    1.  Acquired hypothyroidism (Primary)  Comments:  Continue levothyroxine 100 mcg daily at this time.  Will adjust medication if needed.  Orders:  -     TSH Rfx On Abnormal To Free T4; Future  -     TSH Rfx On Abnormal To Free T4; Future    2. Mixed hyperlipidemia  Comments:  Will notify patient of results and treat accordingly.  Continue healthy eating and routine exercise.  Orders:  -     Comprehensive Metabolic Panel; Future  -     Lipid Panel; Future  -     Lipid Panel; Future  -     Comprehensive Metabolic Panel; Future    3. Neck pain  Comments:  Continue to follow recommendations from neurosurgeon.  Continue with oxycodone 15 mg daily.  Orders:  -     CK; Future    Labs ordered for next visit.     Patient to notify office with any acute concerns or issues.  Patient verbalizes understanding, agrees with plan of care and has no further questions upon discharge.    Please note that portions of this note were completed with a voice recognition program.    Follow Up    Return in about 1 year (around 12/22/2024) for Annual physical.  Patient was given instructions and counseling regarding his condition or for health maintenance advice. Please see specific information pulled into the AVS if appropriate.

## 2023-12-29 DIAGNOSIS — E03.9 ACQUIRED HYPOTHYROIDISM: Primary | ICD-10-CM

## 2024-01-18 ENCOUNTER — HOSPITAL ENCOUNTER (OUTPATIENT)
Dept: ULTRASOUND IMAGING | Facility: HOSPITAL | Age: 44
Discharge: HOME | End: 2024-01-18
Admitting: NURSE PRACTITIONER
Payer: COMMERCIAL

## 2024-01-18 PROCEDURE — 76536 US EXAM OF HEAD AND NECK: CPT

## 2024-02-05 RX ORDER — LEVOTHYROXINE SODIUM 0.1 MG/1
100 TABLET ORAL DAILY
Qty: 90 TABLET | Refills: 1 | Status: SHIPPED | OUTPATIENT
Start: 2024-02-05

## 2024-03-20 ENCOUNTER — TELEPHONE (OUTPATIENT)
Dept: FAMILY MEDICINE CLINIC | Age: 44
End: 2024-03-20
Payer: COMMERCIAL

## 2024-03-29 ENCOUNTER — TELEPHONE (OUTPATIENT)
Dept: FAMILY MEDICINE CLINIC | Age: 44
End: 2024-03-29
Payer: COMMERCIAL

## 2024-06-10 DIAGNOSIS — M54.12 CERVICAL RADICULOPATHY: ICD-10-CM

## 2024-06-10 DIAGNOSIS — M79.10 MUSCLE TENSION PAIN: ICD-10-CM

## 2024-06-10 RX ORDER — CYCLOBENZAPRINE HCL 10 MG
10 TABLET ORAL 3 TIMES DAILY PRN
Qty: 30 TABLET | Refills: 1 | Status: SHIPPED | OUTPATIENT
Start: 2024-06-10

## 2024-09-19 RX ORDER — LEVOTHYROXINE SODIUM 100 UG/1
100 TABLET ORAL DAILY
Qty: 90 TABLET | Refills: 1 | Status: SHIPPED | OUTPATIENT
Start: 2024-09-19

## 2024-10-30 ENCOUNTER — OFFICE VISIT (OUTPATIENT)
Dept: FAMILY MEDICINE CLINIC | Age: 44
End: 2024-10-30
Payer: COMMERCIAL

## 2024-10-30 ENCOUNTER — LAB (OUTPATIENT)
Dept: LAB | Facility: HOSPITAL | Age: 44
End: 2024-10-30
Payer: COMMERCIAL

## 2024-10-30 VITALS
HEART RATE: 78 BPM | SYSTOLIC BLOOD PRESSURE: 135 MMHG | DIASTOLIC BLOOD PRESSURE: 93 MMHG | TEMPERATURE: 98 F | BODY MASS INDEX: 23.83 KG/M2 | HEIGHT: 73 IN | OXYGEN SATURATION: 97 % | WEIGHT: 179.8 LBS

## 2024-10-30 DIAGNOSIS — R53.83 OTHER FATIGUE: Primary | ICD-10-CM

## 2024-10-30 DIAGNOSIS — R53.83 OTHER FATIGUE: ICD-10-CM

## 2024-10-30 DIAGNOSIS — Z20.828 EXPOSURE TO MONONUCLEOSIS SYNDROME: ICD-10-CM

## 2024-10-30 DIAGNOSIS — E78.2 MIXED HYPERLIPIDEMIA: ICD-10-CM

## 2024-10-30 LAB
25(OH)D3 SERPL-MCNC: 31.9 NG/ML (ref 30–100)
BASOPHILS # BLD AUTO: 0.07 10*3/MM3 (ref 0–0.2)
BASOPHILS NFR BLD AUTO: 0.9 % (ref 0–1.5)
CHOLEST SERPL-MCNC: 191 MG/DL (ref 0–200)
DEPRECATED RDW RBC AUTO: 40.9 FL (ref 37–54)
EOSINOPHIL # BLD AUTO: 0.42 10*3/MM3 (ref 0–0.4)
EOSINOPHIL NFR BLD AUTO: 5.3 % (ref 0.3–6.2)
ERYTHROCYTE [DISTWIDTH] IN BLOOD BY AUTOMATED COUNT: 12.4 % (ref 12.3–15.4)
EXPIRATION DATE: NORMAL
EXPIRATION DATE: NORMAL
FERRITIN SERPL-MCNC: 69.8 NG/ML (ref 30–400)
FLUAV AG UPPER RESP QL IA.RAPID: NOT DETECTED
FLUBV AG UPPER RESP QL IA.RAPID: NOT DETECTED
FOLATE SERPL-MCNC: 11.5 NG/ML (ref 4.78–24.2)
HCT VFR BLD AUTO: 52.6 % (ref 37.5–51)
HDLC SERPL-MCNC: 43 MG/DL (ref 40–60)
HETEROPH AB SER QL LA: NEGATIVE
HGB BLD-MCNC: 17.7 G/DL (ref 13–17.7)
IMM GRANULOCYTES # BLD AUTO: 0.02 10*3/MM3 (ref 0–0.05)
IMM GRANULOCYTES NFR BLD AUTO: 0.3 % (ref 0–0.5)
INTERNAL CONTROL: NORMAL
INTERNAL CONTROL: NORMAL
IRON 24H UR-MRATE: 145 MCG/DL (ref 59–158)
IRON SATN MFR SERPL: 35 % (ref 20–50)
LDLC SERPL CALC-MCNC: 137 MG/DL (ref 0–100)
LDLC/HDLC SERPL: 3.16 {RATIO}
LYMPHOCYTES # BLD AUTO: 1.87 10*3/MM3 (ref 0.7–3.1)
LYMPHOCYTES NFR BLD AUTO: 23.4 % (ref 19.6–45.3)
Lab: NORMAL
Lab: NORMAL
MCH RBC QN AUTO: 29.9 PG (ref 26.6–33)
MCHC RBC AUTO-ENTMCNC: 33.7 G/DL (ref 31.5–35.7)
MCV RBC AUTO: 89 FL (ref 79–97)
MONOCYTES # BLD AUTO: 0.79 10*3/MM3 (ref 0.1–0.9)
MONOCYTES NFR BLD AUTO: 9.9 % (ref 5–12)
NEUTROPHILS NFR BLD AUTO: 4.82 10*3/MM3 (ref 1.7–7)
NEUTROPHILS NFR BLD AUTO: 60.2 % (ref 42.7–76)
PLATELET # BLD AUTO: 342 10*3/MM3 (ref 140–450)
PMV BLD AUTO: 9.5 FL (ref 6–12)
RBC # BLD AUTO: 5.91 10*6/MM3 (ref 4.14–5.8)
SARS-COV-2 AG UPPER RESP QL IA.RAPID: NOT DETECTED
TIBC SERPL-MCNC: 419 MCG/DL (ref 298–536)
TRANSFERRIN SERPL-MCNC: 281 MG/DL (ref 200–360)
TRIGL SERPL-MCNC: 60 MG/DL (ref 0–150)
VIT B12 BLD-MCNC: 1457 PG/ML (ref 211–946)
VLDLC SERPL-MCNC: 11 MG/DL (ref 5–40)
WBC NRBC COR # BLD AUTO: 7.99 10*3/MM3 (ref 3.4–10.8)

## 2024-10-30 PROCEDURE — 82728 ASSAY OF FERRITIN: CPT

## 2024-10-30 PROCEDURE — 82607 VITAMIN B-12: CPT

## 2024-10-30 PROCEDURE — 80053 COMPREHEN METABOLIC PANEL: CPT

## 2024-10-30 PROCEDURE — 86038 ANTINUCLEAR ANTIBODIES: CPT

## 2024-10-30 PROCEDURE — 36415 COLL VENOUS BLD VENIPUNCTURE: CPT

## 2024-10-30 PROCEDURE — 82746 ASSAY OF FOLIC ACID SERUM: CPT

## 2024-10-30 PROCEDURE — 99213 OFFICE O/P EST LOW 20 MIN: CPT | Performed by: NURSE PRACTITIONER

## 2024-10-30 PROCEDURE — 86665 EPSTEIN-BARR CAPSID VCA: CPT

## 2024-10-30 PROCEDURE — 86308 HETEROPHILE ANTIBODY SCREEN: CPT | Performed by: NURSE PRACTITIONER

## 2024-10-30 PROCEDURE — 84466 ASSAY OF TRANSFERRIN: CPT

## 2024-10-30 PROCEDURE — 87428 SARSCOV & INF VIR A&B AG IA: CPT | Performed by: NURSE PRACTITIONER

## 2024-10-30 PROCEDURE — 85025 COMPLETE CBC W/AUTO DIFF WBC: CPT

## 2024-10-30 PROCEDURE — 84443 ASSAY THYROID STIM HORMONE: CPT

## 2024-10-30 PROCEDURE — 80061 LIPID PANEL: CPT

## 2024-10-30 PROCEDURE — 82306 VITAMIN D 25 HYDROXY: CPT

## 2024-10-30 PROCEDURE — 83540 ASSAY OF IRON: CPT

## 2024-10-30 PROCEDURE — 86664 EPSTEIN-BARR NUCLEAR ANTIGEN: CPT

## 2024-10-30 RX ORDER — AZITHROMYCIN 250 MG/1
TABLET, FILM COATED ORAL
COMMUNITY
Start: 2024-10-22 | End: 2024-10-30

## 2024-10-30 NOTE — PROGRESS NOTES
"Chief Complaint  Fatigue (Extreme exhaustion, no energy, headaches, high BP readings, tingly hands, body aches.//His daughter tested positive for Mono yesterday./)    Subjective          Babatunde Markham presents to [facility]  History of Present Illness  He had been feeling not good for about a week. He reports that he had URI symptoms and was treated with a Z-pack. His URI issues went away, but he is having lingering fatigue. On Friday, he woke up feeling like he was \"hit by a bus.\" His BP readings at home were 140/90s. His DBP has been running in the 90s. His daughter tested positive for mono. In the last 2-3 days, he has had tingling in his fingers and hands. He was off his thyroid medication for about a week and half ago due to having a hard time from his pharmacy getting it refilled. He denies N/V, fever, chills, unexpected weight loss, abdominal pain. He did have a small cough this past weekend.       Objective   Vital Signs:   /93 (BP Location: Right arm, Patient Position: Sitting)   Pulse 78   Temp 98 °F (36.7 °C) (Oral)   Ht 184.8 cm (72.76\")   Wt 81.6 kg (179 lb 12.8 oz)   SpO2 97% Comment: room air  BMI 23.88 kg/m²     Physical Exam  Constitutional:       Appearance: Normal appearance. He is normal weight.   HENT:      Head: Normocephalic.      Right Ear: Tympanic membrane, ear canal and external ear normal.      Left Ear: Tympanic membrane, ear canal and external ear normal.      Nose: Nose normal.      Mouth/Throat:      Mouth: Mucous membranes are moist.      Pharynx: Oropharynx is clear. No oropharyngeal exudate or posterior oropharyngeal erythema.   Eyes:      Conjunctiva/sclera: Conjunctivae normal.      Pupils: Pupils are equal, round, and reactive to light.   Cardiovascular:      Rate and Rhythm: Normal rate and regular rhythm.      Pulses: Normal pulses.      Heart sounds: Normal heart sounds.   Pulmonary:      Effort: Pulmonary effort is normal.      " Breath sounds: Normal breath sounds.   Musculoskeletal:      Cervical back: Normal range of motion.   Lymphadenopathy:      Cervical: No cervical adenopathy.   Neurological:      Mental Status: He is alert and oriented to person, place, and time.   Psychiatric:         Mood and Affect: Mood normal.         Behavior: Behavior normal.         Thought Content: Thought content normal.        Result Review :   The following data was reviewed by: SALOMÓN Barron on 10/30/2024:                  Assessment and Plan    Diagnoses and all orders for this visit:    1. Other fatigue (Primary)  -     POCT SARS-CoV-2 Antigen MICHAEL + Flu  -     POCT Infectious mononucleosis antibody  -     CBC w AUTO Differential; Future  -     Vitamin B12; Future  -     Folate; Future  -     Ferritin; Future  -     EBV Antibody Profile; Future  -     Iron Profile; Future  -     Vitamin D,25-Hydroxy; Future  -     PABLO; Future    2. Exposure to mononucleosis syndrome  -     EBV Antibody Profile; Future    He is negative for flu, mono, and COVID in office.  He already has some blood work ordered through his PCP, so I am going to order additional blood work to see if we can figure out what the source of his fatigue is.  He had been out of his thyroid medication for about a week and a half, but he is currently taking it.    BMI is within normal parameters. No other follow-up for BMI required.       Follow Up   Return for Pending test results.  Patient was given instructions and counseling regarding his condition or for health maintenance advice. Please see specific information pulled into the AVS if appropriate.

## 2024-11-01 DIAGNOSIS — E04.2 MULTIPLE THYROID NODULES: ICD-10-CM

## 2024-11-01 DIAGNOSIS — R53.83 OTHER FATIGUE: ICD-10-CM

## 2024-11-01 DIAGNOSIS — E03.9 ACQUIRED HYPOTHYROIDISM: Primary | ICD-10-CM

## 2024-11-01 DIAGNOSIS — R71.8 ELEVATED HEMATOCRIT: Primary | ICD-10-CM

## 2024-11-01 LAB
ALBUMIN SERPL-MCNC: 4.6 G/DL (ref 3.5–5.2)
ALBUMIN/GLOB SERPL: 1.4 G/DL
ALP SERPL-CCNC: 52 U/L (ref 39–117)
ALT SERPL W P-5'-P-CCNC: 20 U/L (ref 1–41)
ANA SER QL: NEGATIVE
ANION GAP SERPL CALCULATED.3IONS-SCNC: 20 MMOL/L (ref 5–15)
AST SERPL-CCNC: 25 U/L (ref 1–40)
BILIRUB SERPL-MCNC: 0.4 MG/DL (ref 0–1.2)
BUN SERPL-MCNC: 11 MG/DL (ref 6–20)
BUN/CREAT SERPL: 7.9 (ref 7–25)
CALCIUM SPEC-SCNC: 10.5 MG/DL (ref 8.6–10.5)
CHLORIDE SERPL-SCNC: 100 MMOL/L (ref 98–107)
CO2 SERPL-SCNC: 21 MMOL/L (ref 22–29)
CREAT SERPL-MCNC: 1.39 MG/DL (ref 0.76–1.27)
EBV NA IGG SER IA-ACNC: 582 U/ML (ref 0–17.9)
EBV VCA IGG SER IA-ACNC: >600 U/ML (ref 0–17.9)
EBV VCA IGM SER IA-ACNC: <36 U/ML (ref 0–35.9)
EGFRCR SERPLBLD CKD-EPI 2021: 63.7 ML/MIN/1.73
GLOBULIN UR ELPH-MCNC: 3.2 GM/DL
GLUCOSE SERPL-MCNC: 68 MG/DL (ref 65–99)
POTASSIUM SERPL-SCNC: 5 MMOL/L (ref 3.5–5.2)
PROT SERPL-MCNC: 7.8 G/DL (ref 6–8.5)
SERVICE CMNT-IMP: ABNORMAL
SODIUM SERPL-SCNC: 141 MMOL/L (ref 136–145)
TSH SERPL DL<=0.05 MIU/L-ACNC: 19.9 UIU/ML (ref 0.27–4.2)

## 2024-11-01 NOTE — PROGRESS NOTES
He's been having a lot of fatigue and not feeling well. He was exposed to mono, so I am running those antibodies. He sees you for his thyroid medication, and hasn't had his TSH checked in a while. He had issues getting his medication due to the pharmacy and was out for like 3 weeks. He recently restarted it about 2 weeks ago. I didn't know if you wanted him to adjust his dosing.

## 2024-11-01 NOTE — PROGRESS NOTES
Just a FYI, he wants an urgent referral to endo. He saw them in the past, but he's outside the time frame for an established patient. I'll put the referral in.

## 2024-11-06 ENCOUNTER — OFFICE VISIT (OUTPATIENT)
Dept: ENDOCRINOLOGY | Age: 44
End: 2024-11-06
Payer: COMMERCIAL

## 2024-11-06 VITALS
SYSTOLIC BLOOD PRESSURE: 138 MMHG | HEART RATE: 88 BPM | BODY MASS INDEX: 23.83 KG/M2 | HEIGHT: 73 IN | WEIGHT: 179.8 LBS | OXYGEN SATURATION: 98 % | DIASTOLIC BLOOD PRESSURE: 90 MMHG

## 2024-11-06 DIAGNOSIS — E06.3 HASHIMOTO'S DISEASE: Primary | ICD-10-CM

## 2024-11-06 RX ORDER — TESTOSTERONE CYPIONATE 1000 MG/10ML
100 INJECTION, SOLUTION INTRAMUSCULAR
COMMUNITY

## 2024-11-06 NOTE — PROGRESS NOTES
Chief complaint   No chief complaint on file.         Subjective     History of Present Illness:      This is a 44 years old female I am seeing for thyroid disease   referred by PCP     other medical issues   1- HLD   2- Other     Pt was Dx with hypothyroid many years ago in 2017   Is on Levothyroxine 100 mcg daily and Takes it the proper way   had blood work done by PCP in : TSH was 19.8, t4 free was 0.8    pt states that he was off medication for about 1 month before the last test as he was travelling and was following with Endo in the past there is no history head and Neck radiation, no History of thyroid surgery, no family history of Thyroid disease, + prior history of Thyroid Dysfunction has hashimoto ,  and has No dysphagia, No dyspnea, No dysphonia, No change size of neck, No neck pain or discomfort, No nervousness, No shakiness, No palpitations, Weight stable   BM daily, No diarrhea, No constipation  No edema, No proximal muscle weak  No Cold or Heat Intolerance  No Insomnia  No hair Loss, No Skin Drynress  Appetite is OK  No visual changes     Latest Reference Range & Units 10/30/24 12:00 10/31/24 17:44   TSH Baseline 0.358 - 3.740 uIU/mL 19.900 (H) 19.855 (H) (E)   (H): Data is abnormally high  (E): External lab result    Family History   Problem Relation Age of Onset    Heart disease Father     Heart disease Maternal Grandmother     Heart disease Maternal Grandfather     Alzheimer's disease Paternal Grandmother     Alzheimer's disease Paternal Grandfather     Heart disease Paternal Grandfather      Social History     Socioeconomic History    Marital status: Single   Tobacco Use    Smoking status: Never    Smokeless tobacco: Never   Vaping Use    Vaping status: Never Used   Substance and Sexual Activity    Alcohol use: No    Drug use: No    Sexual activity: Yes     Partners: Female     Birth control/protection: None     Past Medical History:   Diagnosis Date    Acquired hypothyroidism     Acute  thyroiditis     Mixed hyperlipidemia      Past Surgical History:   Procedure Laterality Date    VASECTOMY  06/23/2017       Current Outpatient Medications:     levothyroxine (SYNTHROID, LEVOTHROID) 100 MCG tablet, TAKE ONE TABLET BY MOUTH DAILY, Disp: 90 tablet, Rfl: 1  Patient has no known allergies.    Objective   There were no vitals filed for this visit.       Physical Exam  Neurological:      General: No focal deficit present.      Mental Status: He is alert and oriented to person, place, and time.               There are no diagnoses linked to this encounter.     Assessment:     This is a 44 years old female I am seeing for thyroid disease,referred by PCP       Pt was Dx with hypothyroid many years ago in 2017   Is on Levothyroxine 100 mcg daily and Takes it the proper way   had blood work done by PCP in : TSH was 19.8, t4 free was 0.8  Uncontrolled as he was off medication for about 1 month before the last test as he was travelling , has H/O thyroid nodules in the past   had a long discussion about thyroid and we talked about avoiding low TSH as that can cause heart and bone disease and that not all sx can be related to thyroid.       Plan:    1. Levothyroxine 100 mcg daily   2. Labs at the next visit :TSH, Free T4 and Tpo AB   3. Repeat thyroid US at the next visit   4. Return in 2 months   5. Was informed that no bx is needed now   6. Labs reviewed with the Patient : TSH   7- I reviewed the notes from PCP   8- Needs to go back on PCP for all of the other sx he is having , including TRT   9- Instructions for taking levothyroxine   Brand name is preferred   Take thyroid pill all by itself   Take thyroid pill one hour before food or 2 to 3 hours after food   Heat, humidity, and direct sunlight will cause a loss of potency .      I discussed with the patient/legal representative the risks and the benefits associated with the medications.  The patient/legal representative has been given the opportunity to  ask questions.  Alternatives to the proposed treatment (s) were discussed, including the likely results of no treatment.  The patient/legal representative wishes to proceed.       Tennille Monroe MD   11/06/24  10:09 EST

## 2024-11-08 ENCOUNTER — PATIENT ROUNDING (BHMG ONLY) (OUTPATIENT)
Dept: ENDOCRINOLOGY | Age: 44
End: 2024-11-08
Payer: COMMERCIAL

## 2024-11-21 ENCOUNTER — TELEPHONE (OUTPATIENT)
Dept: FAMILY MEDICINE CLINIC | Age: 44
End: 2024-11-21
Payer: COMMERCIAL

## 2025-01-07 ENCOUNTER — OFFICE VISIT (OUTPATIENT)
Dept: FAMILY MEDICINE CLINIC | Age: 45
End: 2025-01-07
Payer: COMMERCIAL

## 2025-01-07 VITALS
HEIGHT: 73 IN | TEMPERATURE: 98.3 F | DIASTOLIC BLOOD PRESSURE: 92 MMHG | BODY MASS INDEX: 23.35 KG/M2 | SYSTOLIC BLOOD PRESSURE: 138 MMHG | WEIGHT: 176.2 LBS | OXYGEN SATURATION: 98 % | HEART RATE: 74 BPM

## 2025-01-07 DIAGNOSIS — E78.2 MIXED HYPERLIPIDEMIA: ICD-10-CM

## 2025-01-07 DIAGNOSIS — Z12.5 PROSTATE CANCER SCREENING: ICD-10-CM

## 2025-01-07 DIAGNOSIS — E03.9 ACQUIRED HYPOTHYROIDISM: ICD-10-CM

## 2025-01-07 DIAGNOSIS — R79.89 LOW TESTOSTERONE LEVEL IN MALE: ICD-10-CM

## 2025-01-07 DIAGNOSIS — Z00.00 ROUTINE GENERAL MEDICAL EXAMINATION AT A HEALTH CARE FACILITY: Primary | ICD-10-CM

## 2025-01-07 PROBLEM — U07.1 COVID-19 VIRUS INFECTION: Status: RESOLVED | Noted: 2022-01-21 | Resolved: 2025-01-07

## 2025-01-07 PROCEDURE — 99396 PREV VISIT EST AGE 40-64: CPT | Performed by: NURSE PRACTITIONER

## 2025-01-07 NOTE — PROGRESS NOTES
"Chief Complaint  Annual Exam    Subjective          Babatunde Markham presents to [facility] for annual exam.  Patient is taking levothyroxine 100 mcg daily and is now seeing endocrinology.  He had been without medication for approximately 1 month and TSH was off.  He started taking testosterone injections through a clinic in [place] for levels in the 200 range.  Patient states he is feeling much better and feels as if he has more energy.  Declines COVID-vaccine.  Up-to-date on other immunizations.  He is due for a colonoscopy and does have family history.  He is going to ask around the hospitals for a recommendation for a gastroenterology group.    Review of Systems   Constitutional:  Negative for fatigue.   HENT:  Negative for hearing loss and trouble swallowing.    Respiratory:  Negative for cough and shortness of breath.    Cardiovascular:  Negative for chest pain, palpitations and leg swelling.   Gastrointestinal:  Negative for abdominal pain, constipation, diarrhea, nausea and vomiting.   Genitourinary:  Negative for difficulty urinating.   Musculoskeletal:  Negative for arthralgias and myalgias.   Skin:  Negative for rash.   Neurological:  Negative for headaches.   Psychiatric/Behavioral:  Negative for dysphoric mood, sleep disturbance and suicidal ideas. The patient is not nervous/anxious.         Objective   Vital Signs:   Vitals:    01/07/25 1316 01/07/25 1322   BP: 138/91 138/92   Pulse: 72 74   Temp: 98.3 °F (36.8 °C)    TempSrc: Oral    SpO2: 98%    Weight: 79.9 kg (176 lb 3.2 oz)    Height: 185.8 cm (73.15\")        Body mass index is 23.15 kg/m².   BMI is within normal parameters. No other follow-up for BMI required.    Physical Exam  Vitals reviewed.   Constitutional:       General: He is not in acute distress.     Appearance: Normal appearance. He is not diaphoretic.   HENT:      Head: Normocephalic and atraumatic. Hair is normal.      Right Ear: Hearing and external " ear normal.      Left Ear: Hearing and external ear normal.      Nose: Nose normal. No nasal deformity.      Mouth/Throat:      Mouth: Mucous membranes are moist.   Eyes:      General: Lids are normal. No scleral icterus.        Right eye: No discharge.         Left eye: No discharge.      Extraocular Movements: Extraocular movements intact.      Right eye: Normal extraocular motion and no nystagmus.      Left eye: Normal extraocular motion and no nystagmus.      Conjunctiva/sclera: Conjunctivae normal.      Pupils: Pupils are equal, round, and reactive to light.   Neck:      Thyroid: No thyromegaly.   Cardiovascular:      Rate and Rhythm: Normal rate and regular rhythm.      Pulses: Normal pulses.      Heart sounds: Normal heart sounds. No murmur heard.     No gallop.   Pulmonary:      Effort: Pulmonary effort is normal. No respiratory distress.      Breath sounds: Normal breath sounds. No wheezing or rales.   Chest:      Chest wall: No tenderness.   Abdominal:      General: Bowel sounds are normal. There is no distension.      Palpations: Abdomen is soft. There is no mass.      Tenderness: There is no abdominal tenderness. There is no guarding.      Hernia: No hernia is present.   Musculoskeletal:         General: No tenderness or deformity. Normal range of motion.      Cervical back: Normal range of motion and neck supple.   Lymphadenopathy:      Cervical: No cervical adenopathy.   Skin:     General: Skin is warm and dry.      Findings: No rash.   Neurological:      General: No focal deficit present.      Mental Status: He is alert and oriented to person, place, and time.      Coordination: Coordination normal.   Psychiatric:         Mood and Affect: Mood normal.         Behavior: Behavior normal.         Thought Content: Thought content normal.         Judgment: Judgment normal.            Lab Results   Component Value Date    GLUCOSE 68 10/30/2024    BUN 11 10/30/2024    CREATININE 1.39 (H) 10/30/2024    EGFR  63.7 10/30/2024    BCR 7.9 10/30/2024    K 5.0 10/30/2024    CO2 21.0 (L) 10/30/2024    CALCIUM 10.5 10/30/2024    ALBUMIN 4.6 10/30/2024    BILITOT 0.4 10/30/2024    AST 25 10/30/2024    ALT 20 10/30/2024     Lab Results   Component Value Date    HGBA1C 5.50 11/17/2020     Lab Results   Component Value Date    WBC 7.99 10/30/2024    HGB 17.7 10/30/2024    HCT 52.6 (H) 10/30/2024    MCV 89.0 10/30/2024     10/30/2024     Lab Results   Component Value Date    CHOL 191 10/30/2024    CHOL 186 12/22/2023    CHLPL 237 (H) 08/05/2020    CHLPL 220 (H) 11/12/2018    CHLPL 217 (H) 01/23/2018     Lab Results   Component Value Date    TRIG 60 10/30/2024    TRIG 85 12/22/2023    TRIG 59 08/05/2020     Lab Results   Component Value Date    HDL 43 10/30/2024    HDL 45 12/22/2023    HDL 48 08/05/2020     Lab Results   Component Value Date     (H) 10/30/2024     (H) 12/22/2023     (H) 08/05/2020     Lab Results   Component Value Date    TSH 19.855 (H) 10/31/2024                  Assessment and Plan    Assessment & Plan  Routine general medical examination at a health care facility  Counseled on routine dental and eye exams.  Declines COVID vaccines.  Due for routine blood work.  Up-to-date on flu vaccine.  Delayed message sent to patient and box for 1 week regarding gastro referral.         Acquired hypothyroidism  Will notify patient of results and treat accordingly.       Mixed hyperlipidemia    will notify patient of results and treat accordingly.  Heart healthy diet.    Orders:    Comprehensive Metabolic Panel; Future    Lipid Panel; Future    Prostate cancer screening  Will notify patient of results and treat accordingly.  Orders:    PSA Screen; Future    Low testosterone level in male  Continue follow-up with clinic at this time.  Patient was not aware that endocrinology could take over prescription.  Once his membership is over next summer, he will move prescription to Endo.         Patient to  notify office with any acute concerns or issues.  Patient verbalizes understanding, agrees with plan of care and has no further questions upon discharge.    Please note that portions of this note were completed with a voice recognition program.      Follow Up    Return in about 1 year (around 1/7/2026) for Annual physical.  Patient was given instructions and counseling regarding his condition or for health maintenance advice. Please see specific information pulled into the AVS if appropriate.   There are no discontinued medications.

## 2025-01-07 NOTE — ASSESSMENT & PLAN NOTE
will notify patient of results and treat accordingly.  Heart healthy diet.    Orders:    Comprehensive Metabolic Panel; Future    Lipid Panel; Future

## 2025-01-07 NOTE — ASSESSMENT & PLAN NOTE
Continue follow-up with clinic at this time.  Patient was not aware that endocrinology could take over prescription.  Once his membership is over next summer, he will move prescription to Endo.

## 2025-01-09 ENCOUNTER — TELEPHONE (OUTPATIENT)
Dept: ENDOCRINOLOGY | Age: 45
End: 2025-01-09
Payer: COMMERCIAL

## 2025-01-09 DIAGNOSIS — E06.3 HASHIMOTO'S DISEASE: Primary | ICD-10-CM

## 2025-01-14 LAB
T4 FREE SERPL-MCNC: 1.18 NG/DL (ref 0.92–1.68)
THYROPEROXIDASE AB SERPL-ACNC: >600 IU/ML (ref 0–34)
TSH SERPL DL<=0.005 MIU/L-ACNC: 3.6 UIU/ML (ref 0.27–4.2)

## 2025-01-20 ENCOUNTER — OFFICE VISIT (OUTPATIENT)
Dept: ENDOCRINOLOGY | Age: 45
End: 2025-01-20
Payer: COMMERCIAL

## 2025-01-20 VITALS
HEIGHT: 73 IN | HEART RATE: 60 BPM | SYSTOLIC BLOOD PRESSURE: 142 MMHG | TEMPERATURE: 97.5 F | OXYGEN SATURATION: 94 % | DIASTOLIC BLOOD PRESSURE: 74 MMHG | WEIGHT: 178 LBS | BODY MASS INDEX: 23.59 KG/M2

## 2025-01-20 DIAGNOSIS — E06.3 HASHIMOTO'S DISEASE: Primary | ICD-10-CM

## 2025-01-20 PROCEDURE — 99214 OFFICE O/P EST MOD 30 MIN: CPT | Performed by: INTERNAL MEDICINE

## 2025-01-20 RX ORDER — LEVOTHYROXINE SODIUM 100 UG/1
100 TABLET ORAL DAILY
Qty: 90 TABLET | Refills: 1 | Status: SHIPPED | OUTPATIENT
Start: 2025-01-20

## 2025-01-20 NOTE — PROGRESS NOTES
Chief complaint   Chief Complaint   Patient presents with    Hashimoto's disease          Subjective     History of Present Illness:      This is a 44 years old male I am seeing for thyroid disease   referred by PCP   Last OV was 2 months ago   He is here for a follow up   other medical issues   1- HLD   2- Other     Pt was Dx with hypothyroid many years ago in 2017   Is on Levothyroxine 100 mcg daily and Takes it the proper way   had blood work done by PCP in : TSH was 19.8, t4 free was 0.8  had blood work done by me in 1-2025: TSH was 3.6, t4 free was 1.1  pt states that he is back on his medication now and feels much better as he was off medication for about 1 month before the  test in  as he was travelling, and was following with Endo in the past there is no history head and Neck radiation, no History of thyroid surgery, no family history of Thyroid disease, + prior history of Thyroid Dysfunction has hashimoto ,  and has No dysphagia, No dyspnea, No dysphonia, No change size of neck, No neck pain or discomfort, No nervousness, No shakiness, No palpitations, Weight stable   BM daily, No diarrhea, No constipation  No edema, No proximal muscle weak  No Cold or Heat Intolerance  No Insomnia  No hair Loss, No Skin Drynress  Appetite is OK  No visual changes     Latest Reference Range & Units 01/13/25 10:29   TSH Baseline 0.270 - 4.200 uIU/mL 3.600   Free T4 0.92 - 1.68 ng/dL 1.18   Thyroid Peroxidase Antibody 0 - 34 IU/mL >600 (H)   (H): Data is abnormally high   Latest Reference Range & Units 10/30/24 12:00 10/31/24 17:44   TSH Baseline 0.358 - 3.740 uIU/mL 19.900 (H) 19.855 (H) (E)   (H): Data is abnormally high  (E): External lab result    Family History   Problem Relation Age of Onset    Heart disease Father     Heart disease Maternal Grandmother     Heart disease Maternal Grandfather     Alzheimer's disease Paternal Grandmother     Alzheimer's disease Paternal Grandfather     Heart disease  Paternal Grandfather      Social History     Socioeconomic History    Marital status: Single   Tobacco Use    Smoking status: Never    Smokeless tobacco: Never   Vaping Use    Vaping status: Never Used   Substance and Sexual Activity    Alcohol use: No    Drug use: No    Sexual activity: Yes     Partners: Female     Birth control/protection: Condom     Past Medical History:   Diagnosis Date    Acquired hypothyroidism     Acute thyroiditis     Mixed hyperlipidemia     Vitamin D deficiency      Past Surgical History:   Procedure Laterality Date    VASECTOMY  06/23/2017       Current Outpatient Medications:     levothyroxine (SYNTHROID, LEVOTHROID) 100 MCG tablet, Take 1 tablet by mouth Daily., Disp: 90 tablet, Rfl: 1    testosterone cypionate (DEPO-TESTOSTERONE) 100 MG/ML solution injection, Inject 1 mL into the appropriate muscle as directed by prescriber 1 (One) Time Per Week., Disp: , Rfl:   Patient has no known allergies.    Objective   Vitals:    01/20/25 0914   BP: 142/74   Pulse: 60   Temp: 97.5 °F (36.4 °C)   SpO2: 94%            Physical Exam  Neurological:      General: No focal deficit present.      Mental Status: He is alert and oriented to person, place, and time.               Diagnoses and all orders for this visit:    1. Hashimoto's disease (Primary)  -     US Thyroid; Future  -     levothyroxine (SYNTHROID, LEVOTHROID) 100 MCG tablet; Take 1 tablet by mouth Daily.  Dispense: 90 tablet; Refill: 1           Assessment:     This is a 44 years old male I am seeing for thyroid disease,referred by PCP   Pt was Dx with hypothyroid many years ago in 2017   Is on Levothyroxine 100 mcg daily and Takes it the proper way   had blood work done by PCP in : TSH was 19.8, t4 free was 0.8  had blood work done by me in 1-2025: TSH was 3.6, t4 free was 1.1  controlled as he is back on his medication for last 2  month , has H/O thyroid nodules in the past , had a long discussion about thyroid and we talked about  avoiding low TSH as that can cause heart and bone disease and that not all sx can be related to thyroid.       Plan:    1. Levothyroxine 100 mcg daily   2. Labs at the next visit :TSH, Free T4   3. Repeat thyroid US soon   4. Return in 6 months   5. Was informed that no bx is needed now but will wait for the US and see   6. Labs reviewed with the Patient : TSH   7- I reviewed the notes from PCP   8- Needs to go back on PCP for all of the other sx he is having , including TRT   9- Instructions for taking levothyroxine   Brand name is preferred   Take thyroid pill all by itself   Take thyroid pill one hour before food or 2 to 3 hours after food   Heat, humidity, and direct sunlight will cause a loss of potency .      I discussed with the patient/legal representative the risks and the benefits associated with the medications.  The patient/legal representative has been given the opportunity to ask questions.  Alternatives to the proposed treatment (s) were discussed, including the likely results of no treatment.  The patient/legal representative wishes to proceed.       Tennille Monroe MD   01/20/25  09:34 EST

## 2025-01-27 ENCOUNTER — TELEPHONE (OUTPATIENT)
Dept: FAMILY MEDICINE CLINIC | Age: 45
End: 2025-01-27
Payer: COMMERCIAL

## 2025-01-27 NOTE — TELEPHONE ENCOUNTER
1/27 left message regarding overdue labs ordered 1/7/25 with expected date of 1/12/25. 1st attempt

## 2025-01-31 ENCOUNTER — LAB (OUTPATIENT)
Dept: LAB | Facility: HOSPITAL | Age: 45
End: 2025-01-31
Payer: COMMERCIAL

## 2025-01-31 ENCOUNTER — CLINICAL SUPPORT (OUTPATIENT)
Dept: FAMILY MEDICINE CLINIC | Age: 45
End: 2025-01-31
Payer: COMMERCIAL

## 2025-01-31 VITALS — HEART RATE: 80 BPM | SYSTOLIC BLOOD PRESSURE: 132 MMHG | DIASTOLIC BLOOD PRESSURE: 89 MMHG

## 2025-01-31 DIAGNOSIS — E78.2 MIXED HYPERLIPIDEMIA: ICD-10-CM

## 2025-01-31 DIAGNOSIS — Z12.5 PROSTATE CANCER SCREENING: ICD-10-CM

## 2025-01-31 LAB
ALBUMIN SERPL-MCNC: 4.1 G/DL (ref 3.5–5.2)
ALBUMIN/GLOB SERPL: 1.2 G/DL
ALP SERPL-CCNC: 46 U/L (ref 39–117)
ALT SERPL W P-5'-P-CCNC: 22 U/L (ref 1–41)
ANION GAP SERPL CALCULATED.3IONS-SCNC: 8.3 MMOL/L (ref 5–15)
AST SERPL-CCNC: 21 U/L (ref 1–40)
BILIRUB SERPL-MCNC: 0.6 MG/DL (ref 0–1.2)
BUN SERPL-MCNC: 13 MG/DL (ref 6–20)
BUN/CREAT SERPL: 11 (ref 7–25)
CALCIUM SPEC-SCNC: 9.6 MG/DL (ref 8.6–10.5)
CHLORIDE SERPL-SCNC: 102 MMOL/L (ref 98–107)
CHOLEST SERPL-MCNC: 180 MG/DL (ref 0–200)
CO2 SERPL-SCNC: 27.7 MMOL/L (ref 22–29)
CREAT SERPL-MCNC: 1.18 MG/DL (ref 0.76–1.27)
EGFRCR SERPLBLD CKD-EPI 2021: 77.5 ML/MIN/1.73
GLOBULIN UR ELPH-MCNC: 3.3 GM/DL
GLUCOSE SERPL-MCNC: 84 MG/DL (ref 65–99)
HDLC SERPL-MCNC: 41 MG/DL (ref 40–60)
LDLC SERPL CALC-MCNC: 127 MG/DL (ref 0–100)
LDLC/HDLC SERPL: 3.08 {RATIO}
POTASSIUM SERPL-SCNC: 4.6 MMOL/L (ref 3.5–5.2)
PROT SERPL-MCNC: 7.4 G/DL (ref 6–8.5)
PSA SERPL-MCNC: 0.44 NG/ML (ref 0–4)
SODIUM SERPL-SCNC: 138 MMOL/L (ref 136–145)
TRIGL SERPL-MCNC: 64 MG/DL (ref 0–150)
VLDLC SERPL-MCNC: 12 MG/DL (ref 5–40)

## 2025-01-31 PROCEDURE — 80053 COMPREHEN METABOLIC PANEL: CPT

## 2025-01-31 PROCEDURE — G0103 PSA SCREENING: HCPCS

## 2025-01-31 PROCEDURE — 80061 LIPID PANEL: CPT

## 2025-01-31 RX ORDER — LISINOPRIL 10 MG/1
10 TABLET ORAL DAILY
Qty: 90 TABLET | Refills: 1 | Status: SHIPPED | OUTPATIENT
Start: 2025-01-31 | End: 2025-07-30

## 2025-01-31 NOTE — PROGRESS NOTES
Patient informed, appointment made.  Patient does not know who he wants to do colonoscopy, but will send a message on [employer] when he decides.

## 2025-01-31 NOTE — PROGRESS NOTES
Please let patient know that I am starting him on lisinopril 10 mg daily.  I saw where he saw Endo and blood pressure was even slightly higher.  With his cholesterol and with him taking hormone replacement, his goal is really to be under 130/80.  Please schedule a 3-month follow-up.  We will recheck his kidney function at that time.    Also see if he ever figured out who he would want to do his colonoscopy. june Traore

## 2025-02-19 ENCOUNTER — TELEPHONE (OUTPATIENT)
Dept: ENDOCRINOLOGY | Age: 45
End: 2025-02-19
Payer: COMMERCIAL

## 2025-02-19 NOTE — TELEPHONE ENCOUNTER
Called and spoke with pt regarding getting the US scheduled. I gave pt the number for scheduling and he stated that he would get this scheduled soon.

## 2025-03-04 ENCOUNTER — HOSPITAL ENCOUNTER (OUTPATIENT)
Facility: HOSPITAL | Age: 45
Discharge: HOME | End: 2025-03-04
Admitting: INTERNAL MEDICINE
Payer: COMMERCIAL

## 2025-03-04 DIAGNOSIS — E06.3 HASHIMOTO'S DISEASE: ICD-10-CM

## 2025-03-04 PROCEDURE — 76536 US EXAM OF HEAD AND NECK: CPT

## 2025-03-13 ENCOUNTER — OFFICE VISIT (OUTPATIENT)
Dept: FAMILY MEDICINE CLINIC | Age: 45
End: 2025-03-13
Payer: COMMERCIAL

## 2025-03-13 VITALS
OXYGEN SATURATION: 98 % | HEART RATE: 86 BPM | WEIGHT: 146.4 LBS | DIASTOLIC BLOOD PRESSURE: 86 MMHG | TEMPERATURE: 98.2 F | BODY MASS INDEX: 19.4 KG/M2 | HEIGHT: 73 IN | SYSTOLIC BLOOD PRESSURE: 120 MMHG

## 2025-03-13 DIAGNOSIS — J01.00 ACUTE NON-RECURRENT MAXILLARY SINUSITIS: ICD-10-CM

## 2025-03-13 DIAGNOSIS — B02.9 HERPES ZOSTER WITHOUT COMPLICATION: Primary | ICD-10-CM

## 2025-03-13 PROCEDURE — 99213 OFFICE O/P EST LOW 20 MIN: CPT | Performed by: NURSE PRACTITIONER

## 2025-03-13 RX ORDER — VALACYCLOVIR HYDROCHLORIDE 1 G/1
1000 TABLET, FILM COATED ORAL 3 TIMES DAILY
Qty: 21 TABLET | Refills: 0 | Status: SHIPPED | OUTPATIENT
Start: 2025-03-13

## 2025-03-13 NOTE — ASSESSMENT & PLAN NOTE
May take Tylenol or ibuprofen over-the-counter for pain control.  Advised that if pain is not controlled with over-the-counter remedies gabapentin could be utilized.  Patient states he does have gabapentin at home that he takes as needed for neck pain.  Enlarged lymph node of the left groin is likely reactive to shingles.  Continue to monitor and follow-up if it does not return to normal size within a couple of weeks.  Follow-up if lymph node becomes larger or he develops fever.  He could give chickenpox to anyone that has not been vaccinated for varicella or had the chickenpox previously.

## 2025-03-13 NOTE — PROGRESS NOTES
"Chief Complaint  Rash (Left hip, painful. ), Sinusitis, and Herpes Zoster (Shingles, down into the groin. Swollen lymph node. )    Subjective          Babatunde Markham presents to [facility]     Patient is a 44-year-old male who is here today with concerns he may have a sinus infection.  He has had some runny nose postnasal drip and sinus pain and pressure for more than several days.  Denies body aches or fever and does not feel the need to be tested for COVID or flu.  He is currently taking Sudafed and he recently restarted Flonase nasal spray without improvement.  Symptoms feel more notably worse today with sinus pain and pressure more notable.    Over the weekend he noticed a rash along his left hip.  It itched for a couple of days but has become more painful the last 1 to 2 days with stinging burning pain and some intermittent numbness or tingling.  He also feels as if he may have an enlarged lymph node in his left groin since yesterday.  He feels pretty certain that he has shingles.  Pain level is minimal currently.     Objective   Vital Signs:   Vitals:    03/13/25 1247 03/13/25 1320   BP: 135/90 120/86   BP Location: Right arm Left arm   Patient Position: Sitting Sitting   Cuff Size: Adult Adult   Pulse: 86    Temp: 98.2 °F (36.8 °C)    TempSrc: Temporal    SpO2: 98%    Weight: 66.4 kg (146 lb 6.4 oz)    Height: 185.8 cm (73.15\")        Wt Readings from Last 3 Encounters:   03/13/25 66.4 kg (146 lb 6.4 oz)   01/20/25 80.7 kg (178 lb)   01/07/25 79.9 kg (176 lb 3.2 oz)      BP Readings from Last 3 Encounters:   03/13/25 120/86   01/31/25 132/89   01/20/25 142/74       Body mass index is 19.24 kg/m².    BMI is within normal parameters. No other follow-up for BMI required.       Physical Exam  Vitals reviewed.   Constitutional:       General: He is not in acute distress.     Appearance: Normal appearance. He is well-developed.   HENT:      Right Ear: Tympanic membrane normal.      " Left Ear: Tympanic membrane normal.      Nose:      Right Sinus: Frontal sinus tenderness present.      Left Sinus: Frontal sinus tenderness present.      Mouth/Throat:      Pharynx: No oropharyngeal exudate or posterior oropharyngeal erythema.   Cardiovascular:      Rate and Rhythm: Normal rate and regular rhythm.      Heart sounds: Normal heart sounds.   Pulmonary:      Effort: Pulmonary effort is normal.      Breath sounds: Normal breath sounds.   Musculoskeletal:      Right lower leg: No edema.      Left lower leg: No edema.        Legs:    Skin:     General: Skin is warm and dry.   Neurological:      General: No focal deficit present.      Mental Status: He is alert.   Psychiatric:         Attention and Perception: Attention normal.         Mood and Affect: Mood and affect normal.         Behavior: Behavior normal.           Current Outpatient Medications:     amoxicillin-clavulanate (AUGMENTIN) 875-125 MG per tablet, Take 1 tablet by mouth 2 (Two) Times a Day., Disp: 20 tablet, Rfl: 0    levothyroxine (SYNTHROID, LEVOTHROID) 100 MCG tablet, Take 1 tablet by mouth Daily., Disp: 90 tablet, Rfl: 1    lisinopril (PRINIVIL,ZESTRIL) 10 MG tablet, Take 1 tablet by mouth Daily for 180 days., Disp: 90 tablet, Rfl: 1    testosterone cypionate (DEPO-TESTOSTERONE) 100 MG/ML solution injection, Inject 1 mL into the appropriate muscle as directed by prescriber 1 (One) Time Per Week., Disp: , Rfl:     valACYclovir (Valtrex) 1000 MG tablet, Take 1 tablet by mouth 3 (Three) Times a Day., Disp: 21 tablet, Rfl: 0   Past Medical History:   Diagnosis Date    Acquired hypothyroidism     Acute thyroiditis     Mixed hyperlipidemia     Vitamin D deficiency      No Known Allergies            Result Review :     Common labs          10/30/2024    12:00 1/31/2025    09:37   Common Labs   Glucose 68  84    BUN 11  13    Creatinine 1.39  1.18    Sodium 141  138    Potassium 5.0  4.6    Chloride 100  102    Calcium 10.5  9.6    Albumin 4.6   4.1    Total Bilirubin 0.4  0.6    Alkaline Phosphatase 52  46    AST (SGOT) 25  21    ALT (SGPT) 20  22    WBC 7.99     Hemoglobin 17.7     Hematocrit 52.6     Platelets 342     Total Cholesterol 191  180    Triglycerides 60  64    HDL Cholesterol 43  41    LDL Cholesterol  137  127    PSA  0.440         No Images in the past 120 days found..           Social History     Tobacco Use   Smoking Status Never    Passive exposure: Never   Smokeless Tobacco Never           Assessment and Plan    Diagnoses and all orders for this visit:    1. Herpes zoster without complication (Primary)  Assessment & Plan:  May take Tylenol or ibuprofen over-the-counter for pain control.  Advised that if pain is not controlled with over-the-counter remedies gabapentin could be utilized.  Patient states he does have gabapentin at home that he takes as needed for neck pain.  Enlarged lymph node of the left groin is likely reactive to shingles.  Continue to monitor and follow-up if it does not return to normal size within a couple of weeks.  Follow-up if lymph node becomes larger or he develops fever.  He could give chickenpox to anyone that has not been vaccinated for varicella or had the chickenpox previously.    Orders:  -     valACYclovir (Valtrex) 1000 MG tablet; Take 1 tablet by mouth 3 (Three) Times a Day.  Dispense: 21 tablet; Refill: 0    2. Acute non-recurrent maxillary sinusitis  Assessment & Plan:  Sudafed may elevate heart rate or blood pressure.  Patient is currently implementing lifestyle measures and homeopathic remedies to lower blood pressure without having to take lisinopril.  Blood pressure normal on recheck.  May also utilize plain antihistamine such as Claritin Zyrtec or Allegra.  Follow-up if symptoms do not resolve.    Orders:  -     amoxicillin-clavulanate (AUGMENTIN) 875-125 MG per tablet; Take 1 tablet by mouth 2 (Two) Times a Day.  Dispense: 20 tablet; Refill: 0        Follow Up    No follow-ups on file.  Patient  was given instructions and counseling regarding his condition or for health maintenance advice. Please see specific information pulled into the AVS if appropriate.

## 2025-03-13 NOTE — ASSESSMENT & PLAN NOTE
Sudafed may elevate heart rate or blood pressure.  Patient is currently implementing lifestyle measures and homeopathic remedies to lower blood pressure without having to take lisinopril.  Blood pressure normal on recheck.  May also utilize plain antihistamine such as Claritin Zyrtec or Allegra.  Follow-up if symptoms do not resolve.

## 2025-04-28 DIAGNOSIS — E06.3 HASHIMOTO'S DISEASE: ICD-10-CM

## 2025-04-28 RX ORDER — LEVOTHYROXINE SODIUM 100 UG/1
100 TABLET ORAL DAILY
Qty: 90 TABLET | Refills: 1 | OUTPATIENT
Start: 2025-04-28

## 2025-06-05 ENCOUNTER — LAB (OUTPATIENT)
Dept: LAB | Facility: HOSPITAL | Age: 45
End: 2025-06-05
Payer: COMMERCIAL

## 2025-06-05 ENCOUNTER — OFFICE VISIT (OUTPATIENT)
Dept: FAMILY MEDICINE CLINIC | Age: 45
End: 2025-06-05
Payer: COMMERCIAL

## 2025-06-05 VITALS
DIASTOLIC BLOOD PRESSURE: 95 MMHG | HEART RATE: 71 BPM | HEIGHT: 73 IN | WEIGHT: 173 LBS | SYSTOLIC BLOOD PRESSURE: 131 MMHG | BODY MASS INDEX: 22.93 KG/M2 | OXYGEN SATURATION: 100 % | TEMPERATURE: 97.8 F

## 2025-06-05 DIAGNOSIS — R53.81 MALAISE: ICD-10-CM

## 2025-06-05 DIAGNOSIS — Z12.11 ENCOUNTER FOR COLORECTAL CANCER SCREENING: ICD-10-CM

## 2025-06-05 DIAGNOSIS — E06.3 HASHIMOTO'S DISEASE: ICD-10-CM

## 2025-06-05 DIAGNOSIS — Z13.0 SCREENING FOR DEFICIENCY ANEMIA: ICD-10-CM

## 2025-06-05 DIAGNOSIS — E78.2 MIXED HYPERLIPIDEMIA: ICD-10-CM

## 2025-06-05 DIAGNOSIS — E55.9 VITAMIN D DEFICIENCY: ICD-10-CM

## 2025-06-05 DIAGNOSIS — Z12.12 ENCOUNTER FOR COLORECTAL CANCER SCREENING: ICD-10-CM

## 2025-06-05 DIAGNOSIS — E55.9 VITAMIN D DEFICIENCY: Primary | ICD-10-CM

## 2025-06-05 LAB
25(OH)D3 SERPL-MCNC: 35.3 NG/ML (ref 30–100)
ALBUMIN SERPL-MCNC: 4.4 G/DL (ref 3.5–5.2)
ALBUMIN/GLOB SERPL: 1.5 G/DL
ALP SERPL-CCNC: 53 U/L (ref 39–117)
ALT SERPL W P-5'-P-CCNC: 22 U/L (ref 1–41)
ANION GAP SERPL CALCULATED.3IONS-SCNC: 8.3 MMOL/L (ref 5–15)
AST SERPL-CCNC: 23 U/L (ref 1–40)
BASOPHILS # BLD AUTO: 0.08 10*3/MM3 (ref 0–0.2)
BASOPHILS NFR BLD AUTO: 1.3 % (ref 0–1.5)
BILIRUB SERPL-MCNC: 0.4 MG/DL (ref 0–1.2)
BUN SERPL-MCNC: 8 MG/DL (ref 6–20)
BUN/CREAT SERPL: 5.9 (ref 7–25)
CALCIUM SPEC-SCNC: 9.7 MG/DL (ref 8.6–10.5)
CHLORIDE SERPL-SCNC: 104 MMOL/L (ref 98–107)
CHROMATIN AB SERPL-ACNC: <10 IU/ML (ref 0–14)
CO2 SERPL-SCNC: 26.7 MMOL/L (ref 22–29)
CREAT SERPL-MCNC: 1.35 MG/DL (ref 0.76–1.27)
CRP SERPL-MCNC: <0.3 MG/DL (ref 0–0.5)
DEPRECATED RDW RBC AUTO: 42.4 FL (ref 37–54)
EGFRCR SERPLBLD CKD-EPI 2021: 66 ML/MIN/1.73
EOSINOPHIL # BLD AUTO: 0.4 10*3/MM3 (ref 0–0.4)
EOSINOPHIL NFR BLD AUTO: 6.4 % (ref 0.3–6.2)
ERYTHROCYTE [DISTWIDTH] IN BLOOD BY AUTOMATED COUNT: 12.6 % (ref 12.3–15.4)
ERYTHROCYTE [SEDIMENTATION RATE] IN BLOOD: 1 MM/HR (ref 0–15)
FERRITIN SERPL-MCNC: 34.3 NG/ML (ref 30–400)
FOLATE SERPL-MCNC: 13.3 NG/ML (ref 4.78–24.2)
GLOBULIN UR ELPH-MCNC: 3 GM/DL
GLUCOSE SERPL-MCNC: 71 MG/DL (ref 65–99)
HCT VFR BLD AUTO: 48.1 % (ref 37.5–51)
HGB BLD-MCNC: 16.1 G/DL (ref 13–17.7)
IMM GRANULOCYTES # BLD AUTO: 0.01 10*3/MM3 (ref 0–0.05)
IMM GRANULOCYTES NFR BLD AUTO: 0.2 % (ref 0–0.5)
IRON 24H UR-MRATE: 175 MCG/DL (ref 59–158)
IRON SATN MFR SERPL: 46 % (ref 20–50)
LYMPHOCYTES # BLD AUTO: 1.81 10*3/MM3 (ref 0.7–3.1)
LYMPHOCYTES NFR BLD AUTO: 29 % (ref 19.6–45.3)
MCH RBC QN AUTO: 30 PG (ref 26.6–33)
MCHC RBC AUTO-ENTMCNC: 33.5 G/DL (ref 31.5–35.7)
MCV RBC AUTO: 89.7 FL (ref 79–97)
MONOCYTES # BLD AUTO: 0.67 10*3/MM3 (ref 0.1–0.9)
MONOCYTES NFR BLD AUTO: 10.7 % (ref 5–12)
NEUTROPHILS NFR BLD AUTO: 3.28 10*3/MM3 (ref 1.7–7)
NEUTROPHILS NFR BLD AUTO: 52.4 % (ref 42.7–76)
PLATELET # BLD AUTO: 321 10*3/MM3 (ref 140–450)
PMV BLD AUTO: 9.6 FL (ref 6–12)
POTASSIUM SERPL-SCNC: 4.3 MMOL/L (ref 3.5–5.2)
PROT SERPL-MCNC: 7.4 G/DL (ref 6–8.5)
RBC # BLD AUTO: 5.36 10*6/MM3 (ref 4.14–5.8)
SODIUM SERPL-SCNC: 139 MMOL/L (ref 136–145)
T3FREE SERPL-MCNC: 3.11 PG/ML (ref 2–4.4)
T4 FREE SERPL-MCNC: 1.44 NG/DL (ref 0.92–1.68)
TIBC SERPL-MCNC: 381 MCG/DL (ref 298–536)
TRANSFERRIN SERPL-MCNC: 256 MG/DL (ref 200–360)
TSH SERPL DL<=0.05 MIU/L-ACNC: 3.54 UIU/ML (ref 0.27–4.2)
VIT B12 BLD-MCNC: 1248 PG/ML (ref 211–946)
WBC NRBC COR # BLD AUTO: 6.25 10*3/MM3 (ref 3.4–10.8)

## 2025-06-05 PROCEDURE — 85025 COMPLETE CBC W/AUTO DIFF WBC: CPT

## 2025-06-05 PROCEDURE — 82728 ASSAY OF FERRITIN: CPT

## 2025-06-05 PROCEDURE — 82306 VITAMIN D 25 HYDROXY: CPT

## 2025-06-05 PROCEDURE — 86431 RHEUMATOID FACTOR QUANT: CPT

## 2025-06-05 PROCEDURE — 84481 FREE ASSAY (FT-3): CPT

## 2025-06-05 PROCEDURE — 83540 ASSAY OF IRON: CPT

## 2025-06-05 PROCEDURE — 82607 VITAMIN B-12: CPT

## 2025-06-05 PROCEDURE — 84443 ASSAY THYROID STIM HORMONE: CPT

## 2025-06-05 PROCEDURE — 82746 ASSAY OF FOLIC ACID SERUM: CPT

## 2025-06-05 PROCEDURE — 36415 COLL VENOUS BLD VENIPUNCTURE: CPT

## 2025-06-05 PROCEDURE — 86140 C-REACTIVE PROTEIN: CPT

## 2025-06-05 PROCEDURE — 87484 EHRLICHA CHAFFEENSIS AMP PRB: CPT | Performed by: NURSE PRACTITIONER

## 2025-06-05 PROCEDURE — 87468 ANAPLSMA PHGCYTOPHLM AMP PRB: CPT | Performed by: NURSE PRACTITIONER

## 2025-06-05 PROCEDURE — 86063 ANTISTREPTOLYSIN O SCREEN: CPT

## 2025-06-05 PROCEDURE — 84439 ASSAY OF FREE THYROXINE: CPT

## 2025-06-05 PROCEDURE — 86618 LYME DISEASE ANTIBODY: CPT | Performed by: NURSE PRACTITIONER

## 2025-06-05 PROCEDURE — 86376 MICROSOMAL ANTIBODY EACH: CPT

## 2025-06-05 PROCEDURE — 80053 COMPREHEN METABOLIC PANEL: CPT

## 2025-06-05 PROCEDURE — 85652 RBC SED RATE AUTOMATED: CPT

## 2025-06-05 PROCEDURE — 87798 DETECT AGENT NOS DNA AMP: CPT | Performed by: NURSE PRACTITIONER

## 2025-06-05 PROCEDURE — 84466 ASSAY OF TRANSFERRIN: CPT

## 2025-06-05 NOTE — PROGRESS NOTES
"Chief Complaint  Hypothyroidism body aches/htn    Subjective          Babatunde Markham presents to [facility] for an acute visit. He has been feeling not well that last several months. Pt is going to [facility] for hashimotos. Next appt in in July. Will have random \"aching\" pains to various areas of his body. States some concern about a possible tick bite. Would like full lab work up.     Pt has not started the lisinopril. He has been taking some homeopathic medication and readings have been 120s/80s at home. Has been out of medicine for 4-5 days.     Denies fever, chills, nausea, vomiting, diarrhea, shortness of air or chest pain.  Denies abdominal pain.  States he had a headache yesterday.       Objective   Vital Signs:   Vitals:    06/05/25 0943   BP: 131/95   Pulse: 71   Temp: 97.8 °F (36.6 °C)   TempSrc: Oral   SpO2: 100%   Weight: 78.5 kg (173 lb)   Height: 185.8 cm (73.15\")       Body mass index is 22.73 kg/m².  BMI is within normal parameters. No other follow-up for BMI required.       Physical Exam  Vitals reviewed.   Constitutional:       General: He is not in acute distress.     Appearance: He is well-developed.   HENT:      Head: Normocephalic and atraumatic.   Eyes:      Conjunctiva/sclera: Conjunctivae normal.      Pupils: Pupils are equal, round, and reactive to light.   Cardiovascular:      Rate and Rhythm: Normal rate and regular rhythm.      Heart sounds: Normal heart sounds. No murmur heard.  Pulmonary:      Effort: Pulmonary effort is normal. No respiratory distress.      Breath sounds: Normal breath sounds.   Skin:     General: Skin is warm and dry.   Neurological:      Mental Status: He is alert and oriented to person, place, and time.   Psychiatric:         Mood and Affect: Mood and affect normal.         Behavior: Behavior normal.         Thought Content: Thought content normal.         Judgment: Judgment normal.                        Assessment and Plan    Assessment " & Plan  Vitamin D deficiency  Will notify patient of results and treat accordingly.     Orders:    Vitamin D,25-Hydroxy; Future    Encounter for colorectal cancer screening  Referral initiated.   Orders:    Ambulatory Referral For Screening Colonoscopy    Malaise  Will notify patient of results and treat accordingly.     Orders:    Lyme Disease Total Antibody With Reflex to Immunoassay    Ehrlichia Profile DNA PCR    Rickettsia Species DNA, Real-Time PCR    Hashimoto's disease  Will notify patient of results and treat accordingly.   Labs drawn for endo.   Orders:    T3, Free; Future    T4, Free; Future    TSH; Future    Comprehensive Metabolic Panel; Future    Sedimentation Rate; Future    C-reactive protein; Future    Rheumatoid Factor; Future    Antistreptolysin O screen; Future    Thyroid Peroxidase Antibody; Future    Screening for deficiency anemia  Will notify patient of results and treat accordingly.     Orders:    CBC Auto Differential; Future    Vitamin B12 & Folate; Future    Ferritin; Future    Iron Profile w/o Ferritin; Future    Mixed hyperlipidemia   pt to start otc supplement. Ordering cardiac calcium score. Will notify patient of results and treat accordingly. Heart healthy diet and routine exercise.     Orders:    CT Cardiac Calcium Score Without Dye; Future             Patient to notify office with any acute concerns or issues.  Patient verbalizes understanding, agrees with plan of care and has no further questions upon discharge.    Please note that portions of this note were completed with a voice recognition program.      Follow Up    Return in about 6 months (around 12/5/2025) for Annual physical.  Patient was given instructions and counseling regarding his condition or for health maintenance advice. Please see specific information pulled into the AVS if appropriate.       Current Outpatient Medications:     levothyroxine (SYNTHROID, LEVOTHROID) 100 MCG tablet, Take 1 tablet by mouth Daily.,  Disp: 90 tablet, Rfl: 1    testosterone cypionate (DEPO-TESTOSTERONE) 100 MG/ML solution injection, Inject  into the appropriate muscle as directed by prescriber 1 (One) Time Per Week. .03mL weekly, Disp: , Rfl:     lisinopril (PRINIVIL,ZESTRIL) 10 MG tablet, Take 1 tablet by mouth Daily for 180 days. (Patient not taking: Reported on 6/5/2025), Disp: 90 tablet, Rfl: 1  Medications Discontinued During This Encounter   Medication Reason    valACYclovir (Valtrex) 1000 MG tablet Historical Med - Therapy completed    amoxicillin-clavulanate (AUGMENTIN) 875-125 MG per tablet Historical Med - Therapy completed

## 2025-06-05 NOTE — ASSESSMENT & PLAN NOTE
pt to start otc supplement. Ordering cardiac calcium score. Will notify patient of results and treat accordingly. Heart healthy diet and routine exercise.     Orders:    CT Cardiac Calcium Score Without Dye; Future

## 2025-06-05 NOTE — ASSESSMENT & PLAN NOTE
Will notify patient of results and treat accordingly.   Labs drawn for endo.   Orders:    T3, Free; Future    T4, Free; Future    TSH; Future    Comprehensive Metabolic Panel; Future    Sedimentation Rate; Future    C-reactive protein; Future    Rheumatoid Factor; Future    Antistreptolysin O screen; Future    Thyroid Peroxidase Antibody; Future

## 2025-06-07 LAB
ASO AB SERPL-ACNC: NEGATIVE [IU]/ML
B BURGDOR IGG+IGM SER QL IA: NEGATIVE
THYROPEROXIDASE AB SERPL-ACNC: >600 IU/ML (ref 0–34)

## 2025-06-11 LAB
A PHAGOCYTOPH DNA BLD QL NAA+PROBE: NEGATIVE
EHRLICHIA DNA SPEC QL NAA+PROBE: NEGATIVE

## 2025-06-12 ENCOUNTER — RESULTS FOLLOW-UP (OUTPATIENT)
Dept: FAMILY MEDICINE CLINIC | Age: 45
End: 2025-06-12
Payer: COMMERCIAL

## 2025-06-12 LAB — RICKETTSIA RICKETTSII DNA, RT: NOT DETECTED

## 2025-06-12 NOTE — LETTER
Babatunde Markham  [address] [place] [id number]    June 17, 2025     Dear Mr. Markham:    All results are normal.    Thanks     Yuki Dowd     Resulted Orders   Lyme Disease Total Antibody With Reflex to Immunoassay   Result Value Ref Range    Lyme Total Antibody EIA Negative Negative      Comment:      Lyme antibodies not detected. Reflex testing is not indicated.  No laboratory evidence of infection with B. burgdorferi (Lyme disease).  Negative results may occur in patients recently infected (less than  or equal to 14 days) with B. burgdorferi.  If recent infection is  suspected, repeat testing on a new sample collected in 7 to 14 days is  recommended.   Ehrlichia Profile DNA PCR   Result Value Ref Range    A. phagocytophilum PCR Negative Negative      Comment:      No Anaplasma phagocytophilum DNA detected.  A. phagocytophilum has been  characterized as the causative agent of Human Granulocytic  Ehrlichiosis (HGE).    Ehrlichia sp., PCR Negative Negative      Comment:      No Ehrlichia sp. DNA detected.   Rickettsia Species DNA, Real-Time PCR   Result Value Ref Range    Rickettsia rickettsii DNA, RT Not Detected       Comment:      REFERENCE RANGE: NOT DETECTED  This test was developed and its analytical performance  characteristics have been determined by [employer].  It has not been cleared or approved by FDA. This assay has  been validated pursuant to the CLIA regulations and is  used for clinical purposes.   Ferritin   Result Value Ref Range    Ferritin 34.30 30.00 - 400.00 ng/mL   T3, Free   Result Value Ref Range    T3, Free 3.11 2.00 - 4.40 pg/mL   T4, Free   Result Value Ref Range    Free T4 1.44 0.92 - 1.68 ng/dL   TSH   Result Value Ref Range    TSH 3.540 0.270 - 4.200 uIU/mL   CBC Auto Differential   Result Value Ref Range    WBC 6.25 3.40 - 10.80 10*3/mm3    RBC 5.36 4.14 - 5.80 10*6/mm3    Hemoglobin 16.1 13.0 - 17.7 g/dL    Hematocrit 48.1 37.5 - 51.0 %    MCV 89.7 79.0 - 97.0 fL     MCH 30.0 26.6 - 33.0 pg    MCHC 33.5 31.5 - 35.7 g/dL    RDW 12.6 12.3 - 15.4 %    RDW-SD 42.4 37.0 - 54.0 fl    MPV 9.6 6.0 - 12.0 fL    Platelets 321 140 - 450 10*3/mm3    Neutrophil % 52.4 42.7 - 76.0 %    Lymphocyte % 29.0 19.6 - 45.3 %    Monocyte % 10.7 5.0 - 12.0 %    Eosinophil % 6.4 (H) 0.3 - 6.2 %    Basophil % 1.3 0.0 - 1.5 %    Immature Grans % 0.2 0.0 - 0.5 %    Neutrophils, Absolute 3.28 1.70 - 7.00 10*3/mm3    Lymphocytes, Absolute 1.81 0.70 - 3.10 10*3/mm3    Monocytes, Absolute 0.67 0.10 - 0.90 10*3/mm3    Eosinophils, Absolute 0.40 0.00 - 0.40 10*3/mm3    Basophils, Absolute 0.08 0.00 - 0.20 10*3/mm3    Immature Grans, Absolute 0.01 0.00 - 0.05 10*3/mm3   Comprehensive Metabolic Panel   Result Value Ref Range    Glucose 71 65 - 99 mg/dL    BUN 8.0 6.0 - 20.0 mg/dL    Creatinine 1.35 (H) 0.76 - 1.27 mg/dL    Sodium 139 136 - 145 mmol/L    Potassium 4.3 3.5 - 5.2 mmol/L    Chloride 104 98 - 107 mmol/L    CO2 26.7 22.0 - 29.0 mmol/L    Calcium 9.7 8.6 - 10.5 mg/dL    Total Protein 7.4 6.0 - 8.5 g/dL    Albumin 4.4 3.5 - 5.2 g/dL    ALT (SGPT) 22 1 - 41 U/L    AST (SGOT) 23 1 - 40 U/L    Alkaline Phosphatase 53 39 - 117 U/L    Total Bilirubin 0.4 0.0 - 1.2 mg/dL    Globulin 3.0 gm/dL    A/G Ratio 1.5 g/dL    BUN/Creatinine Ratio 5.9 (L) 7.0 - 25.0    Anion Gap 8.3 5.0 - 15.0 mmol/L    eGFR 66.0 >60.0 mL/min/1.73   Sedimentation Rate   Result Value Ref Range    Sed Rate 1 0 - 15 mm/hr   C-reactive protein   Result Value Ref Range    C-Reactive Protein <0.30 0.00 - 0.50 mg/dL   Rheumatoid Factor   Result Value Ref Range    Rheumatoid Factor Quantitative <10.0 0.0 - 14.0 IU/mL   Antistreptolysin O screen   Result Value Ref Range    ASO Negative Negative   Vitamin D,25-Hydroxy   Result Value Ref Range    25 Hydroxy, Vitamin D 35.3 30.0 - 100.0 ng/ml   Vitamin B12 & Folate   Result Value Ref Range    Folate 13.30 4.78 - 24.20 ng/mL    Vitamin B-12 1,248 (H) 211 - 946 pg/mL   Iron Profile w/o Ferritin    Result Value Ref Range    Iron 175 (H) 59 - 158 mcg/dL    Iron Saturation (TSAT) 46 20 - 50 %    Transferrin 256 200 - 360 mg/dL    TIBC 381 298 - 536 mcg/dL   Thyroid Peroxidase Antibody   Result Value Ref Range    Thyroid Peroxidase Antibody >600 (H) 0 - 34 IU/mL       The test results show that your current treatment is working. Please {:06334}.   We recommend that you repeat the above test(s) in {Numbers; 1-10:01883} {Time; units w/plural:11}.    If you have any questions or concerns, please don't hesitate to call.         Sincerely,        SALOMÓN Correa

## 2025-06-18 ENCOUNTER — TELEPHONE (OUTPATIENT)
Dept: FAMILY MEDICINE CLINIC | Age: 45
End: 2025-06-18
Payer: COMMERCIAL

## 2025-06-18 NOTE — TELEPHONE ENCOUNTER
"    Caller: Babatunde Markham \"Job\"    Relationship: Self    Best call back number: [phone]     What is the medical concern/diagnosis: CHEST PAIN AND PRESSURE    What specialty or service is being requested: CARDIOLOGY    What is the provider, practice or medical service name: [facility]    What is the office location: [place]    What is the office phone number: [phone]    Any additional details: PATIENT WAS SEEN AT [facility] ON 6.16.25 AND WAS ADVISED TO SEE A CARDIOLOGIST          "

## 2025-06-24 ENCOUNTER — TELEPHONE (OUTPATIENT)
Dept: FAMILY MEDICINE CLINIC | Age: 45
End: 2025-06-24
Payer: COMMERCIAL

## 2025-06-24 NOTE — TELEPHONE ENCOUNTER
1st attempt to reach patient regarding CT scan order placed 6/5/25.  Patient said that he was not aware of this order being placed and would like to discuss with PCP at next scheduled appointment.

## 2025-06-26 ENCOUNTER — OFFICE VISIT (OUTPATIENT)
Dept: FAMILY MEDICINE CLINIC | Age: 45
End: 2025-06-26
Payer: COMMERCIAL

## 2025-06-26 VITALS
HEIGHT: 73 IN | TEMPERATURE: 98.1 F | HEART RATE: 84 BPM | OXYGEN SATURATION: 99 % | SYSTOLIC BLOOD PRESSURE: 122 MMHG | WEIGHT: 171 LBS | DIASTOLIC BLOOD PRESSURE: 90 MMHG | BODY MASS INDEX: 22.66 KG/M2

## 2025-06-26 DIAGNOSIS — R07.9 CHEST PAIN, UNSPECIFIED TYPE: ICD-10-CM

## 2025-06-26 DIAGNOSIS — E78.2 MIXED HYPERLIPIDEMIA: ICD-10-CM

## 2025-06-26 DIAGNOSIS — F32.A ANXIETY AND DEPRESSION: Primary | ICD-10-CM

## 2025-06-26 DIAGNOSIS — F41.9 ANXIETY AND DEPRESSION: Primary | ICD-10-CM

## 2025-06-26 DIAGNOSIS — R79.89 LOW TESTOSTERONE LEVEL IN MALE: ICD-10-CM

## 2025-06-26 PROCEDURE — 99214 OFFICE O/P EST MOD 30 MIN: CPT | Performed by: NURSE PRACTITIONER

## 2025-06-26 RX ORDER — ESCITALOPRAM OXALATE 5 MG/1
5 TABLET ORAL DAILY
Qty: 30 TABLET | Refills: 1 | Status: SHIPPED | OUTPATIENT
Start: 2025-06-26

## 2025-06-26 NOTE — PROGRESS NOTES
"Chief Complaint  Hospital Follow Up Visit ([facility] 6/16/25 chest discomfort and dyspnea)    Subjective          Babatunde Markham presents to [facility] for ER f/u. Went to [facility] 6/16/25 for chest pain and soa. Work up was negative. Has a cardio appt later this afternoon. He is to discuss if he needs to do the cardiac calcium score scan.     Pt has been going to male clinic in [facility] for low testosterone and would like referral to urology for them to manage instead.     Pt believes that his chest discomfort and soa may have stemmed from anxiety. He has his children 70% of the time with little help and works a full time job. He feels very overwhelmed at times.     Objective   Vital Signs:   Vitals:    06/26/25 1126   BP: 122/90   Pulse: 84   Temp: 98.1 °F (36.7 °C)   TempSrc: Oral   SpO2: 99%   Weight: 77.6 kg (171 lb)   Height: 185.8 cm (73.15\")       Body mass index is 22.47 kg/m².  BMI is within normal parameters. No other follow-up for BMI required.       Physical Exam  Vitals reviewed.   Constitutional:       General: He is not in acute distress.     Appearance: He is well-developed.   HENT:      Head: Normocephalic and atraumatic.   Eyes:      Conjunctiva/sclera: Conjunctivae normal.      Pupils: Pupils are equal, round, and reactive to light.   Neck:      Vascular: No carotid bruit.   Cardiovascular:      Rate and Rhythm: Normal rate and regular rhythm.      Heart sounds: Normal heart sounds. No murmur heard.  Pulmonary:      Effort: Pulmonary effort is normal. No respiratory distress.      Breath sounds: Normal breath sounds.   Skin:     General: Skin is warm and dry.   Neurological:      Mental Status: He is alert and oriented to person, place, and time.   Psychiatric:         Mood and Affect: Mood and affect normal.         Behavior: Behavior normal.         Thought Content: Thought content normal.         Judgment: Judgment normal.                        Assessment and Plan  "   Assessment & Plan  Anxiety and depression    Starting patient on lexapro 5mg daily. Potential side effects of medication discussed.          Low testosterone level in male  Referral initiated.   Orders:    Ambulatory Referral to Urology    Chest pain, unspecified type  Follow up with SALOMÓN Pearson for work up.        Mixed hyperlipidemia   discuss cardio calcium score. Heart healthy diet.          Other orders    escitalopram (Lexapro) 5 MG tablet; Take 1 tablet by mouth Daily.        Patient to notify office with any acute concerns or issues.  Patient verbalizes understanding, agrees with plan of care and has no further questions upon discharge.    Please note that portions of this note were completed with a voice recognition program.      Follow Up    Return in about 6 weeks (around 8/7/2025) for Recheck.  Patient was given instructions and counseling regarding his condition or for health maintenance advice. Please see specific information pulled into the AVS if appropriate.       Current Outpatient Medications:     levothyroxine (SYNTHROID, LEVOTHROID) 100 MCG tablet, Take 1 tablet by mouth Daily., Disp: 90 tablet, Rfl: 1    testosterone cypionate (DEPO-TESTOSTERONE) 100 MG/ML solution injection, Inject  into the appropriate muscle as directed by prescriber 1 (One) Time Per Week. .03mL weekly, Disp: , Rfl:     escitalopram (Lexapro) 5 MG tablet, Take 1 tablet by mouth Daily., Disp: 30 tablet, Rfl: 1  There are no discontinued medications.

## 2025-07-01 PROBLEM — F41.9 ANXIETY AND DEPRESSION: Status: ACTIVE | Noted: 2025-07-01

## 2025-07-01 PROBLEM — F32.A ANXIETY AND DEPRESSION: Status: ACTIVE | Noted: 2025-07-01

## 2025-07-01 NOTE — ASSESSMENT & PLAN NOTE
{Depression A/P Block (Optional):02870}  Starting patient on lexapro 5mg daily. Potential side effects of medication discussed.

## 2025-07-01 NOTE — ASSESSMENT & PLAN NOTE
{Hyperlipidemia A/P Block (Optional):8315513190}discuss cardio calcium score. Heart healthy diet.

## 2025-08-05 ENCOUNTER — OFFICE VISIT (OUTPATIENT)
Dept: ENDOCRINOLOGY | Age: 45
End: 2025-08-05
Payer: COMMERCIAL

## 2025-08-05 VITALS
BODY MASS INDEX: 22.8 KG/M2 | WEIGHT: 172 LBS | HEIGHT: 73 IN | DIASTOLIC BLOOD PRESSURE: 84 MMHG | SYSTOLIC BLOOD PRESSURE: 122 MMHG | OXYGEN SATURATION: 97 % | HEART RATE: 78 BPM

## 2025-08-05 DIAGNOSIS — E06.3 HASHIMOTO'S DISEASE: Primary | ICD-10-CM

## 2025-08-05 PROCEDURE — 99214 OFFICE O/P EST MOD 30 MIN: CPT | Performed by: INTERNAL MEDICINE

## 2025-08-05 RX ORDER — LEVOTHYROXINE SODIUM 100 UG/1
100 TABLET ORAL DAILY
Qty: 90 TABLET | Refills: 1 | Status: SHIPPED | OUTPATIENT
Start: 2025-08-05 | End: 2025-08-05

## 2025-08-05 RX ORDER — LEVOTHYROXINE SODIUM 100 UG/1
100 TABLET ORAL DAILY
Qty: 90 TABLET | Refills: 1 | Status: SHIPPED | OUTPATIENT
Start: 2025-08-05

## 2025-08-05 RX ORDER — ASPIRIN 81 MG
1 TABLET,CHEWABLE ORAL DAILY
COMMUNITY
Start: 2025-06-16

## 2025-08-27 ENCOUNTER — HOSPITAL ENCOUNTER (OUTPATIENT)
Dept: CT IMAGING | Facility: HOSPITAL | Age: 45
Discharge: HOME | End: 2025-08-27
Admitting: NURSE PRACTITIONER

## 2025-08-27 DIAGNOSIS — E78.2 MIXED HYPERLIPIDEMIA: ICD-10-CM

## 2025-08-27 PROCEDURE — 75571 CT HRT W/O DYE W/CA TEST: CPT
